# Patient Record
Sex: FEMALE | Race: WHITE | HISPANIC OR LATINO | Employment: UNEMPLOYED | URBAN - METROPOLITAN AREA
[De-identification: names, ages, dates, MRNs, and addresses within clinical notes are randomized per-mention and may not be internally consistent; named-entity substitution may affect disease eponyms.]

---

## 2017-01-12 ENCOUNTER — ROUTINE PRENATAL (OUTPATIENT)
Dept: OBSTETRICS AND GYNECOLOGY | Facility: CLINIC | Age: 42
End: 2017-01-12
Payer: MEDICAID

## 2017-01-12 VITALS — DIASTOLIC BLOOD PRESSURE: 82 MMHG | WEIGHT: 192 LBS | BODY MASS INDEX: 36.28 KG/M2 | SYSTOLIC BLOOD PRESSURE: 115 MMHG

## 2017-01-12 DIAGNOSIS — Z3A.16 16 WEEKS GESTATION OF PREGNANCY: Primary | ICD-10-CM

## 2017-01-12 DIAGNOSIS — O09.521 AMA (ADVANCED MATERNAL AGE) MULTIGRAVIDA 35+, FIRST TRIMESTER: ICD-10-CM

## 2017-01-12 DIAGNOSIS — O24.410 DIET CONTROLLED GESTATIONAL DIABETES MELLITUS (GDM) IN SECOND TRIMESTER: ICD-10-CM

## 2017-01-12 PROCEDURE — 99999 PR PBB SHADOW E&M-EST. PATIENT-LVL II: CPT | Mod: PBBFAC,,, | Performed by: OBSTETRICS & GYNECOLOGY

## 2017-01-12 PROCEDURE — 99212 OFFICE O/P EST SF 10 MIN: CPT | Mod: PBBFAC,PO | Performed by: OBSTETRICS & GYNECOLOGY

## 2017-01-12 PROCEDURE — 99213 OFFICE O/P EST LOW 20 MIN: CPT | Mod: TH,S$PBB,, | Performed by: OBSTETRICS & GYNECOLOGY

## 2017-01-12 NOTE — PROGRESS NOTES
Jill Kimbrough 41 y.o. yo   yo    IUP @ 16w2d by LMP/ US  C/o:  Fetal movements: No  Abdominal cramping:  No  Vaginal Bleeding:  No  Leakage of Fluid:  No  Passage of tissue:  No  Breast Tenderness:  No  Nausea/Vomiting:  No    Risks/History is pertinent for Gestational Diabetes     PE  Vitals noted    A/P  1- 41 y.o.yo   IUP @ 16w2d   by  LMP/US    Went to Diabetic teaching but DiD not follow with  BS monitoring recommendations ( Will go today to see if can get a new meter) .Following the diet   M21 Ordered today x AMA      FU in= 4weeks    Counseling:   Patient was counseled today on proper weight gain based on the [place] of [facility]'s recommendations based on her pre-pregnancy weight. Discussed foods to avoid in pregnancy (i.e. sushi, fish that are high in mercury, deli meat, and unpasteurized cheeses). She is advised to avoid all raw meat and fish, raw eggs. One can tuna max per week. Discussed prenatal vitamin options (i.e. stool softener, DHA). Prenatal screening options discussed with the patient, including quad screen and amniocentesis. Exercise in pregnancy reviewed with patient as well. Info on medications that can be used in pregnancy were given to the patient. Use Miralax daily for constipation correction.   Discussed: Common complaints of pregnancy, HIV and other routine prenatal tests, Tobacco abuse, risk factors identified by prenatal history, Anticipated course of prenatal careNutrition and weight gain counseling,Toxoplasmosis precautions (Cats/Raw Meat) Exercise, Alcohol, Illicit/Recreational Drugs, Seat belt use, Childbirth classes/Hospital facilities.The counseling session lasted approximately 25 minutes, and all her questions were answered.     Floyd Jacome M.D.   OB/GYN    2017

## 2017-01-30 ENCOUNTER — TELEPHONE (OUTPATIENT)
Dept: OBSTETRICS AND GYNECOLOGY | Facility: CLINIC | Age: 42
End: 2017-01-30

## 2017-01-30 NOTE — TELEPHONE ENCOUNTER
M21 resulted negative    XX baby if patient wants to know     Floyd Jacome M.D.   OB/GYN    1/30/2017

## 2017-02-02 ENCOUNTER — TELEPHONE (OUTPATIENT)
Dept: OBSTETRICS AND GYNECOLOGY | Facility: CLINIC | Age: 42
End: 2017-02-02

## 2017-02-02 NOTE — TELEPHONE ENCOUNTER
Called [facility] and informed them pt has a True Metrix Air meter and instructed to test 4 times a day called in lancets and testing strips with 6 refills to last her the pregnancy pharmacy understood and verbalized understanding.

## 2017-02-02 NOTE — TELEPHONE ENCOUNTER
----- Message from Elvira Hagan sent at 2/2/2017  7:47 AM CST -----  Contact: [phone]/ self   Pt its requesting a refill on her needle pens and test strips sent to [facility] [phone]. Please advise

## 2017-02-09 ENCOUNTER — OFFICE VISIT (OUTPATIENT)
Dept: OBSTETRICS AND GYNECOLOGY | Facility: CLINIC | Age: 42
End: 2017-02-09
Payer: MEDICAID

## 2017-02-09 ENCOUNTER — ROUTINE PRENATAL (OUTPATIENT)
Dept: OBSTETRICS AND GYNECOLOGY | Facility: CLINIC | Age: 42
End: 2017-02-09
Payer: MEDICAID

## 2017-02-09 VITALS
BODY MASS INDEX: 37.12 KG/M2 | DIASTOLIC BLOOD PRESSURE: 78 MMHG | SYSTOLIC BLOOD PRESSURE: 118 MMHG | WEIGHT: 196.44 LBS

## 2017-02-09 DIAGNOSIS — Z34.90 PREGNANCY, UNSPECIFIED GESTATIONAL AGE: Primary | ICD-10-CM

## 2017-02-09 DIAGNOSIS — O09.522 AMA (ADVANCED MATERNAL AGE) MULTIGRAVIDA 35+, SECOND TRIMESTER: ICD-10-CM

## 2017-02-09 DIAGNOSIS — O24.410 DIET CONTROLLED GESTATIONAL DIABETES MELLITUS (GDM) IN SECOND TRIMESTER: ICD-10-CM

## 2017-02-09 DIAGNOSIS — Z3A.16 16 WEEKS GESTATION OF PREGNANCY: ICD-10-CM

## 2017-02-09 DIAGNOSIS — Z36.3 ANTENATAL SCREENING FOR MALFORMATION USING ULTRASONICS: Primary | ICD-10-CM

## 2017-02-09 PROCEDURE — 99999 PR PBB SHADOW E&M-EST. PATIENT-LVL II: CPT | Mod: PBBFAC,,, | Performed by: OBSTETRICS & GYNECOLOGY

## 2017-02-09 PROCEDURE — 76805 OB US >/= 14 WKS SNGL FETUS: CPT | Mod: 26,S$PBB,, | Performed by: OBSTETRICS & GYNECOLOGY

## 2017-02-09 PROCEDURE — 99213 OFFICE O/P EST LOW 20 MIN: CPT | Mod: TH,S$PBB,, | Performed by: OBSTETRICS & GYNECOLOGY

## 2017-02-09 PROCEDURE — 76805 OB US >/= 14 WKS SNGL FETUS: CPT | Mod: PBBFAC,PO

## 2017-02-09 NOTE — PROGRESS NOTES
Jill Kimbrough 41 y.o. yo   yo    IUP @ 20 w2d by LMP/ US  C/o:  Fetal movements: No  Abdominal cramping:  No  Vaginal Bleeding:  No  Leakage of Fluid:  No  Passage of tissue:  No  Breast Tenderness:  No  Nausea/Vomiting:  No    Risks/History is pertinent for Gestational Diabetes and AMA      PE  Vitals noted    A/P  1- 41 y.o.yo   IUP @ 20 w2d   by  LMP/US    Went to diabetic teaching. Brings BS log and most  values are within range ( yesterday had hamburger/fries , so were quite  high)    M21 =Negative   Anatomy US today = normal anatomy     FU in= 4 weeks wit BS log, recommended to be on top of the diet,     Counseling:   Patient was counseled today on proper weight gain based on the [place] of [facility]'s recommendations based on her pre-pregnancy weight. Discussed foods to avoid in pregnancy (i.e. sushi, fish that are high in mercury, deli meat, and unpasteurized cheeses). She is advised to avoid all raw meat and fish, raw eggs. One can tuna max per week. Discussed prenatal vitamin options (i.e. stool softener, DHA). Prenatal screening options discussed with the patient, including quad screen and amniocentesis. Exercise in pregnancy reviewed with patient as well. Info on medications that can be used in pregnancy were given to the patient. Use Miralax daily for constipation correction.   Discussed: Common complaints of pregnancy, HIV and other routine prenatal tests, Tobacco abuse, risk factors identified by prenatal history, Anticipated course of prenatal careNutrition and weight gain counseling,Toxoplasmosis precautions (Cats/Raw Meat) Exercise, Alcohol, Illicit/Recreational Drugs, Seat belt use, Childbirth classes/Hospital facilities.The counseling session lasted approximately 25 minutes, and all her questions were answered.     Floyd Jacome M.D.   OB/GYN    2017

## 2017-02-14 ENCOUNTER — INITIAL CONSULT (OUTPATIENT)
Dept: OPTOMETRY | Facility: CLINIC | Age: 42
End: 2017-02-14
Payer: MEDICAID

## 2017-02-14 DIAGNOSIS — O24.419 GESTATIONAL DIABETES MELLITUS (GDM) IN SECOND TRIMESTER, GESTATIONAL DIABETES METHOD OF CONTROL UNSPECIFIED: Primary | ICD-10-CM

## 2017-02-14 PROCEDURE — 92004 COMPRE OPH EXAM NEW PT 1/>: CPT | Mod: S$PBB,,, | Performed by: OPTOMETRIST

## 2017-02-14 PROCEDURE — 92015 DETERMINE REFRACTIVE STATE: CPT | Mod: ,,, | Performed by: OPTOMETRIST

## 2017-02-14 PROCEDURE — 99212 OFFICE O/P EST SF 10 MIN: CPT | Mod: PBBFAC,PO | Performed by: OPTOMETRIST

## 2017-02-14 PROCEDURE — 99999 PR PBB SHADOW E&M-EST. PATIENT-LVL II: CPT | Mod: PBBFAC,,, | Performed by: OPTOMETRIST

## 2017-02-14 NOTE — LETTER
February 14, 2017      Floyd Jacome MD  [address] [facility] [address]           [place] [facility]  [address] [facility]  [place] [address]  Phone: [phone]  Fax: [phone]          Patient: Jill Kimbrough   MR Number: [id number]   YOB: 1975   Date of Visit: 2/14/2017       Dear Dr. Floyd Jacome:    Thank you for referring Jill Kimbrough to me for evaluation. Attached you will find relevant portions of my assessment and plan of care.    If you have questions, please do not hesitate to call me. I look forward to following Jill Kimbrough along with you.    Sincerely,    Renzo Lin, OD    Enclosure  CC:  No Recipients    If you would like to receive this communication electronically, please contact [email or web address] or [phone] to request more information on [employer] Link access.    For providers and/or their staff who would like to refer a patient to [facility], please contact us through our one-stop-shop provider referral line, [facility] , at [phone].    If you feel you have received this communication in error or would no longer like to receive these types of communications, please e-mail [email or web address]

## 2017-02-14 NOTE — PROGRESS NOTES
HPI     TEX: 6+ years ago   Pt states harder to read. States when driving eyes get red and itch   Denies f/f    No gtts     Did not check BS this morning   Hemoglobin A1C       Date                     Value               Ref Range             Status                12/13/2016               5.5                 4.5 - 6.2 %           Final              ----------    Gestational DM (5 months preg)  Hx Horse Shoe Palsy right side 5 years ago         Last edited by Renzo Lin, OD on 2/14/2017  9:24 AM.     ROS     Positive for: Endocrine    Negative for: Constitutional, Gastrointestinal, Neurological, Skin,   Genitourinary, Musculoskeletal, HENT, Cardiovascular, Eyes, Respiratory,   Psychiatric, Allergic/Imm, Heme/Lymph    Last edited by Renzo Lin, OD on 2/14/2017  9:17 AM. (History)        Assessment /Plan     For exam results, see Encounter Report.    Gestational diabetes mellitus (GDM) in second trimester, gestational diabetes method of control unspecified      1. Gestational DM- WITHOUT RETINOPATHY. Discussed visual flux assoc w sugar flux.  Pt may be having early presby sx--advised otc readers for now, and may come back one month after baby born for refraction for spex  2. MERT--advised SYSTANE ATs prn    PLAN:    rtc 1 yr, sooner for spex as above    (ESL pt--had )

## 2017-03-09 ENCOUNTER — ROUTINE PRENATAL (OUTPATIENT)
Dept: OBSTETRICS AND GYNECOLOGY | Facility: CLINIC | Age: 42
End: 2017-03-09
Payer: MEDICAID

## 2017-03-09 VITALS
WEIGHT: 198.19 LBS | DIASTOLIC BLOOD PRESSURE: 70 MMHG | BODY MASS INDEX: 37.45 KG/M2 | SYSTOLIC BLOOD PRESSURE: 108 MMHG

## 2017-03-09 DIAGNOSIS — O09.522 AMA (ADVANCED MATERNAL AGE) MULTIGRAVIDA 35+, SECOND TRIMESTER: ICD-10-CM

## 2017-03-09 DIAGNOSIS — O24.410 DIET CONTROLLED GESTATIONAL DIABETES MELLITUS (GDM) IN SECOND TRIMESTER: ICD-10-CM

## 2017-03-09 DIAGNOSIS — Z3A.24 24 WEEKS GESTATION OF PREGNANCY: Primary | ICD-10-CM

## 2017-03-09 PROCEDURE — 99999 PR PBB SHADOW E&M-EST. PATIENT-LVL II: CPT | Mod: PBBFAC,,, | Performed by: OBSTETRICS & GYNECOLOGY

## 2017-03-09 PROCEDURE — 99213 OFFICE O/P EST LOW 20 MIN: CPT | Mod: TH,S$PBB,, | Performed by: OBSTETRICS & GYNECOLOGY

## 2017-03-09 PROCEDURE — 99212 OFFICE O/P EST SF 10 MIN: CPT | Mod: PBBFAC,PO | Performed by: OBSTETRICS & GYNECOLOGY

## 2017-03-09 NOTE — PROGRESS NOTES
Jill Kimbrough 41 y.o. yo   yo    IUP @ 24w2d by LMP/ US  C/o:  Fetal movements: yes  Abdominal cramping:  No  Vaginal Bleeding:  No  Leakage of Fluid:  No  Passage of tissue:  No  Breast Tenderness:  No  Nausea/Vomiting:  No    Risks/History is pertinent for Gestational Diabetes and AMA    Non complaint. Forgets to check the blood sugar as recommended  Does not bring the BS log . It seems that is not following the Diet       PE  Vitals noted    A/P  1- 41 y.o.yo   IUP @ 24 w2d   by  LMP/US      M21 =Negative   Anatomy US today = normal anatomy     FU in= 4 weeks with BS log, recommended to follow with the recommendations               To check the glucose levels, and to bring the log so I can see them     Counseling:   Patient was counseled today on proper weight gain based on the [place] of [facility]'s recommendations based on her pre-pregnancy weight. Discussed foods to avoid in pregnancy (i.e. sushi, fish that are high in mercury, deli meat, and unpasteurized cheeses). She is advised to avoid all raw meat and fish, raw eggs. One can tuna max per week. Discussed prenatal vitamin options (i.e. stool softener, DHA). Prenatal screening options discussed with the patient, including quad screen and amniocentesis. Exercise in pregnancy reviewed with patient as well. Info on medications that can be used in pregnancy were given to the patient. Use Miralax daily for constipation correction.   Discussed: Common complaints of pregnancy, HIV and other routine prenatal tests, Tobacco abuse, risk factors identified by prenatal history, Anticipated course of prenatal careNutrition and weight gain counseling,Toxoplasmosis precautions (Cats/Raw Meat) Exercise, Alcohol, Illicit/Recreational Drugs, Seat belt use, Childbirth classes/Hospital facilities.The counseling session lasted approximately 25 minutes, and all her questions were answered.     Floyd Jacome M.D.   OB/GYN    3/9/2017

## 2017-03-13 ENCOUNTER — TELEPHONE (OUTPATIENT)
Dept: OBSTETRICS AND GYNECOLOGY | Facility: CLINIC | Age: 42
End: 2017-03-13

## 2017-03-13 NOTE — TELEPHONE ENCOUNTER
----- Message from Eliane Tawny sent at 3/13/2017 12:52 PM CDT -----  Contact: self, [phone]  Patient requests to be seen tomorrow morning if possible, states her hands are getting numb more often every day now. Please advise.

## 2017-03-25 ENCOUNTER — HOSPITAL ENCOUNTER (OUTPATIENT)
Facility: HOSPITAL | Age: 42
Discharge: HOME | End: 2017-03-25
Attending: OBSTETRICS & GYNECOLOGY | Admitting: OBSTETRICS & GYNECOLOGY
Payer: MEDICAID

## 2017-03-25 VITALS — SYSTOLIC BLOOD PRESSURE: 121 MMHG | DIASTOLIC BLOOD PRESSURE: 72 MMHG | HEART RATE: 105 BPM | OXYGEN SATURATION: 97 %

## 2017-03-25 DIAGNOSIS — R10.9 ABDOMINAL PAIN COMPLICATING PREGNANCY: ICD-10-CM

## 2017-03-25 DIAGNOSIS — O26.899 ABDOMINAL PAIN COMPLICATING PREGNANCY: ICD-10-CM

## 2017-03-25 LAB
BILIRUB UR QL STRIP: NEGATIVE
CLARITY UR: ABNORMAL
COLOR UR: YELLOW
GLUCOSE UR QL STRIP: NEGATIVE
HGB UR QL STRIP: ABNORMAL
KETONES UR QL STRIP: ABNORMAL
LEUKOCYTE ESTERASE UR QL STRIP: ABNORMAL
MICROSCOPIC COMMENT: NORMAL
NITRITE UR QL STRIP: NEGATIVE
PH UR STRIP: 6 [PH] (ref 5–8)
PROT UR QL STRIP: NEGATIVE
RBC #/AREA URNS HPF: 0 /HPF (ref 0–4)
SP GR UR STRIP: 1.01 (ref 1–1.03)
URN SPEC COLLECT METH UR: ABNORMAL
UROBILINOGEN UR STRIP-ACNC: 1 EU/DL
WBC #/AREA URNS HPF: 2 /HPF (ref 0–5)

## 2017-03-25 PROCEDURE — G0378 HOSPITAL OBSERVATION PER HR: HCPCS

## 2017-03-25 PROCEDURE — 81000 URINALYSIS NONAUTO W/SCOPE: CPT

## 2017-03-25 PROCEDURE — 99211 OFF/OP EST MAY X REQ PHY/QHP: CPT

## 2017-03-25 PROCEDURE — 87086 URINE CULTURE/COLONY COUNT: CPT

## 2017-03-25 NOTE — PLAN OF CARE
Pt arrived to unit from ED with c/o lower abd pain and pressure that started yesterday, and worsens when she uses the bathroom. Pt also c/o mucous vaginal discharge. Pt denies bleeding or leaking fluid. Pt gowned and oriented to room. efms applied. FFN collected and sve done. Full assessment completed. Hx reviewed. poc explained to pt and her daughter who is at the bedside with her. Will continue to monitor.    Dr. feng on unit. md aware of pts arrival and complaint, as well as present sve. md states to collect a urine for ua, may dispose of FFN. Pt may be discharged once she is able to give urine specimen. md will call her with results of culture.    1415- pt up to restroom to give urine sample. Explained to pt that she will discharged home once she urinates as she is not in labor and her baby looks good. Pt verbalizes understanding.

## 2017-03-25 NOTE — IP AVS SNAPSHOT
[facility]  [address]  [place] [address]  Phone: [phone]           Instrucciones de Marina de Pacientes    Nuestro objetivo es que te prepara para el éxito. Gi paquete incluye información sobre ybarra enfermedad, medicamentos y atención médica a domicilio. [place] le ayudará a cuidar y que no se enferman más y necesita volver al hospital.     Por favor, pregunte a ybarra enfermera si tiene alguna pregunta.       Hay muchos detalles a recordar cuando se prepara para salir del hospital. [place] es lo que necesita hacer:    1. [place] ybarra medicina. Si usted tiene celi receta, revise la lista de medicamentos en las siguientes páginas. Es posible que tenga nuevos medicamentos para recoger en la farmacia y otros que tendrá que dejar de danielle. Revise las instrucciones sobre cómo y cuándo danielle lamont medicamentos. Consulte con el médico o el enfermeras si no está seguro de qué hacer.    2. Ir a lamont citas de seguimiento. La información específica de seguimiento aparece en las siguientes páginas. Usted puede ser contactado por celi enfermera o proveedor clínico sobre las próximas citas. Estar seguro de que tiene todos los números de teléfono para comunicarse si es necesario. Por favor, póngase en contacto con la oficina de ybarra profesional médico si no puede hacer celi eva.     3. Esté atento a señales de advertencia. El médico o la enfermera le dará señales de alarma detallados que debe observar y cuándo llamar para la ayuda. Estas instrucciones también pueden incluir información educativa acerca de ybarra condición. Si usted experimenta cualquiera de las señales de advertencia para ybarra catrachito, llame a ybarra médico.             [facility]lamada    Si ybarra médico no le ha indicado a lo contrário, por favor   contactar a [facility] [place], nuestra línea de cuidado de enfermeras está disponible para asistirle 24/7.    [phone] (servicio gratuito)    Enfermeras registradas de [facility] pueden ayudarle a reservar celi eva,  proveer educación para la catrachito, asesoría clínica, y otros servicios de asesoramiento.                  ** Verificar la lista de medicamentos es correcta y está actualizada. Llevar esto con usted en tyler de emergencia. Si regla medicamentos butterfield cambiado, por favor notifique a ybarra proveedor de atención médica.             Lista de medicamentos      CONSULTE con ybarra médico sobre estos medicamentos        Additional Info                      PRENATAL #115-IRON-FOLIC ACID ORAL   Recargas:  0    Instrucciones:  Take by mouth.     Begin Date    AM    Noon    PM    Bedtime                  Por favor traiga a todos las citas de seguimiento:    1. Adina copia de las instrucciones de derick.  2. Todos los medicamentos que está tomando malgorzata momento, en regla envases originales.  3. Identificación y tarjeta de seguro.    Por favor llegue 15 minutos antes de la hora de la eva.    Por favor llame con 24 horas de antelación si tiene que cambiar ybarra eva y / o tiempo.        Regla Citas Programadas     Apr 06, 2017 10:15 AM CDT   Routine Prenatal Visit with MD [place] [facility] ([place])    [address] [place] [address], [address]  [address] [place] [address]  [place] [id number] [phone]                  Instrucciones a johnathan de derick       Discharge home in stable condition. Dr. Jacome will call on Monday with results of urinalysis.      Información de Admisión     Fecha y hora Proveedor Departamento [facility]    3/25/2017  1:40 PM Floyd Jacome MD [facility] [phone]      Los proveedores de cuidado     Personal Médico Rol [facility] Teléfono principal de la oficina    Floyd Jacome MD Attending Provider Obstetrics and Gynecology [phone]      Regla signos vitales isabel     PS Pulso Ultima menstruación SpO2          121/72 105 09/20/2016 97%        Recent Lab Values        12/13/2016                           9:11 AM           A1C 5.5           Comment for A1C at  9:11 AM on 12/13/2016:  According to ADA  guidelines, hemoglobin A1C <7.0% represents  optimal control in non-pregnant diabetic patients.  Different  metrics may apply to specific populations.   Standards of Medical Care in Diabetes - 2016.  For the purpose of screening for the presence of diabetes:  <5.7%     Consistent with the absence of diabetes  5.7-6.4%  Consistent with increasing risk for diabetes   (prediabetes)  >or=6.5%  Consistent with diabetes  Currently no consensus exists for use of hemoglobin A1C  for diagnosis of diabetes for children.        Pending Labs     Order Current Status    Urinalysis In process    Urine culture In process      Alergias     A partir del:  3/25/2017        No Known Allergies      Directiva Anticipada     Adina directiva anticipada es un documento que, en tyler de que ya no capaz de hacer decisiones por sí mismo, le dice a ybarra equipo médico qué tipo de tratamiento quiere o no quiere recibir, o que le gustaría danielle esas decisiones para usted . Si actualmente no tiene adina directiva anticipada, [facility] le anima a crear abby. Para más información llame al: [phone]), o entrando en [email or web address].        Language Assistance Services     ATTENTION: Language assistance services are available, free of charge. Please call [phone].      ATENCIÓN: Si habla español, tiene a ybarra disposición servicios gratuitos de asistencia lingüística. Llame al [phone].     CHÚ Ý: N?u b?n nói Ti?ng Vi?t, có các d?ch v? h? tr? ngôn ng? mi?n phí dành cho b?n. G?i s? [phone].        Registrarse para [facility]     La activación de ybarra cuenta [facility] es tan fácil chuck 1-2-3!    1) Ir a [email or web address], seleccione Registrarse Ahora, meter el código de activación y ybarra fecha de nacimiento, y seleccione Próximo.    [id number]  Expires: 5/9/2017  2:22 PM      2) Crear un nombre de usuario y contraseña para usar cuando se visita [facility] en el futuro y selecciona adina pregunta  de seguridad en tyler de que pierda ybarra contraseña y seleccione Próximo.    3) Introduzca ybarra dirección de correo electrónico y mary abelardo en Registrarse!    Información Adicional  Si tiene alguna pregunta, por favor, e-mail [email or web address] o llame al [phone] para hablar con nuestro personal. Recuerde, [facility] no debe ser usada para necesidades urgentes. En tyler de emergencia médica, llame al [phone].         [facility] cumple con las leyes federales aplicables de derechos civiles y no discrimina por motivos de quinn, color, origen nacional, edad, discapacidad, o sexo.                        [facility]  [address]  [place] [address]  Phone: [phone]           Patient Discharge Instructions     Our goal is to set you up for success. This packet includes information on your condition, medications, and your home care. It will help you to care for yourself so you don't get sicker and need to go back to the hospital.     Please ask your nurse if you have any questions.        There are many details to remember when preparing to leave the hospital. Here is what you will need to do:    1. Take your medicine. If you are prescribed medications, review your Medication List in the following pages. You may have new medications to  at the pharmacy and others that you'll need to stop taking. Review the instructions for how and when to take your medications. Talk with your doctor or nurses if you are unsure of what to do.     2. Go to your follow-up appointments. Specific follow-up information is listed in the following pages. Your may be contacted by a transition nurse or clinical provider about future appointments. Be sure we have all of the phone numbers to reach you, if needed. Please contact your provider's office if you are unable to make an appointment.     3. Watch for warning signs. Your doctor or nurse will give you detailed warning signs to watch for and when to call for  assistance. These instructions may also include educational information about your condition. If you experience any of warning signs to your health, call your doctor.               [facility]  Unless otherwise directed by your provider, please contact [facility], our nurse care line that is available for 24/7 assistance.     [phone] (toll-free)    Registered nurses in the [facility] provide clinical advisement, health education, appointment booking, and other advisory services.                ** Verificar la lista de medicamentos es correcta y está actualizada. Llevar esto con usted en tyler de emergencia. Si lamont medicamentos butterfield cambiado, por favor notifique a ybarra proveedor de atención médica.             Medication List      ASK your doctor about these medications        Additional Info                      PRENATAL #115-IRON-FOLIC ACID ORAL   Refills:  0    Instructions:  Take by mouth.     Begin Date    AM    Noon    PM    Bedtime                  Por favor traiga a todos las citas de seguimiento:    1. Adina copia de las instrucciones de derick.  2. Todos los medicamentos que está tomando malgorzata momento, en lamont envases originales.  3. Identificación y tarjeta de seguro.    Por favor llegue 15 minutos antes de la hora de la eva.    Por favor llame con 24 horas de antelación si tiene que cambiar ybarra eva y / o tiempo.        Your Scheduled Appointments     Apr 06, 2017 10:15 AM CDT   Routine Prenatal Visit with MD [place] [facility] ([place])    [address] [place] [address], [address]  [address]th [address] [facility]  [place] [address] [phone]                  Discharge Instructions       Discharge home in stable condition. Dr. Jacome will call on Monday with results of urinalysis.      Admission Information     Date & Time Provider Department [facility]    3/25/2017  1:40 PM Floyd Jacome MD [facility] [phone]      Care Providers     Provider Role Specialty Primary  office phone    Floyd Jacome MD Attending Provider Obstetrics and Gynecology [phone]      Your Vitals Were     BP Pulse Last Period SpO2          121/72 105 09/20/2016 97%        Recent Lab Values        12/13/2016                           9:11 AM           A1C 5.5           Comment for A1C at  9:11 AM on 12/13/2016:  According to ADA guidelines, hemoglobin A1C <7.0% represents  optimal control in non-pregnant diabetic patients.  Different  metrics may apply to specific populations.   Standards of Medical Care in Diabetes - 2016.  For the purpose of screening for the presence of diabetes:  <5.7%     Consistent with the absence of diabetes  5.7-6.4%  Consistent with increasing risk for diabetes   (prediabetes)  >or=6.5%  Consistent with diabetes  Currently no consensus exists for use of hemoglobin A1C  for diagnosis of diabetes for children.        Pending Labs     Order Current Status    Urinalysis In process    Urine culture In process      Allergies as of 3/25/2017     No Known Allergies      Advance Directives     An advance directive is a document which, in the event you are no longer able to make decisions for yourself, tells your healthcare team what kind of treatment you do or do not want to receive, or who you would like to make those decisions for you.  If you do not currently have an advance directive, [facility] encourages you to create one.  For more information call:  [phone]), [phone] ([phone]),  or log on to [email or web address].        Language Assistance Services     ATTENTION: Language assistance services are available, free of charge. Please call [phone].      ATENCIÓN: Si habla español, tiene a ybarra disposición servicios gratuitos de asistencia lingüística. Llame al [phone].     CHÚ Ý: N?u b?n nói Ti?ng Vi?t, có các d?ch v? h? tr? ngôn ng? mi?n phí dành cho b?n. G?i s? [phone].        [facility] Sign-Up     Activating your [facility]  account is as easy as 1-2-3!     1) Visit [email or web address], select Sign Up Now, enter this activation code and your date of birth, then select Next.  [email or web address]  Expires: 5/9/2017  2:22 PM      2) Create a username and password to use when you visit [facility] in the future and select a security question in case you lose your password and select Next.    3) Enter your e-mail address and click Sign Up!    Additional Information  If you have questions, please e-mail [email or web address] or call [phone] to talk to our [facility] [facility] staff. Remember, [facility] is NOT to be used for urgent needs. For medical emergencies, dial 911.          [facility] complies with applicable Federal civil rights laws and does not discriminate on the basis of race, color, national origin, age, disability, or sex.

## 2017-03-26 ENCOUNTER — ANESTHESIA EVENT (OUTPATIENT)
Dept: OBSTETRICS AND GYNECOLOGY | Facility: OTHER | Age: 42
End: 2017-03-26
Payer: MEDICAID

## 2017-03-26 ENCOUNTER — ANESTHESIA (OUTPATIENT)
Dept: OBSTETRICS AND GYNECOLOGY | Facility: OTHER | Age: 42
End: 2017-03-26
Payer: MEDICAID

## 2017-03-26 ENCOUNTER — HOSPITAL ENCOUNTER (INPATIENT)
Facility: OTHER | Age: 42
LOS: 4 days | Discharge: HOME | End: 2017-03-30
Attending: OBSTETRICS & GYNECOLOGY | Admitting: OBSTETRICS & GYNECOLOGY
Payer: MEDICAID

## 2017-03-26 ENCOUNTER — HOSPITAL ENCOUNTER (OUTPATIENT)
Facility: HOSPITAL | Age: 42
End: 2017-03-26
Attending: OBSTETRICS & GYNECOLOGY | Admitting: OBSTETRICS & GYNECOLOGY
Payer: MEDICAID

## 2017-03-26 VITALS
BODY MASS INDEX: 37.11 KG/M2 | HEART RATE: 111 BPM | SYSTOLIC BLOOD PRESSURE: 128 MMHG | WEIGHT: 189 LBS | HEIGHT: 60 IN | DIASTOLIC BLOOD PRESSURE: 60 MMHG | OXYGEN SATURATION: 97 % | TEMPERATURE: 99 F

## 2017-03-26 DIAGNOSIS — O09.522 AMA (ADVANCED MATERNAL AGE) MULTIGRAVIDA 35+, SECOND TRIMESTER: ICD-10-CM

## 2017-03-26 DIAGNOSIS — O24.410 DIET CONTROLLED GESTATIONAL DIABETES MELLITUS (GDM) IN SECOND TRIMESTER: ICD-10-CM

## 2017-03-26 DIAGNOSIS — Z30.09 UNWANTED FERTILITY: ICD-10-CM

## 2017-03-26 DIAGNOSIS — O47.00 THREATENED PRETERM LABOR: ICD-10-CM

## 2017-03-26 DIAGNOSIS — R10.9 ABDOMINAL PAIN AFFECTING PREGNANCY: ICD-10-CM

## 2017-03-26 DIAGNOSIS — O26.899 ABDOMINAL PAIN AFFECTING PREGNANCY: ICD-10-CM

## 2017-03-26 DIAGNOSIS — O42.919 PRETERM PREMATURE RUPTURE OF MEMBRANES, ANTEPARTUM: Primary | ICD-10-CM

## 2017-03-26 DIAGNOSIS — O09.42 GRAND MULTIPARITY WITH ANTENATAL PROBLEM, SECOND TRIMESTER: ICD-10-CM

## 2017-03-26 DIAGNOSIS — O47.02 THREATENED PRETERM LABOR, SECOND TRIMESTER: ICD-10-CM

## 2017-03-26 DIAGNOSIS — Z36.3 ENCOUNTER FOR ROUTINE SCREENING FOR MALFORMATION USING ULTRASONICS: ICD-10-CM

## 2017-03-26 LAB
ABO + RH BLD: NORMAL
ALBUMIN SERPL BCP-MCNC: 2.6 G/DL
ALP SERPL-CCNC: 79 U/L
ALT SERPL W/O P-5'-P-CCNC: 12 U/L
ANION GAP SERPL CALC-SCNC: 10 MMOL/L
AST SERPL-CCNC: 12 U/L
BACTERIA UR CULT: NORMAL
BASOPHILS # BLD AUTO: 0.02 K/UL
BASOPHILS NFR BLD: 0.1 %
BILIRUB SERPL-MCNC: 0.4 MG/DL
BLD GP AB SCN CELLS X3 SERPL QL: NORMAL
BUN SERPL-MCNC: 4 MG/DL
CALCIUM SERPL-MCNC: 9.1 MG/DL
CHLORIDE SERPL-SCNC: 105 MMOL/L
CO2 SERPL-SCNC: 19 MMOL/L
CREAT SERPL-MCNC: 0.6 MG/DL
DIFFERENTIAL METHOD: ABNORMAL
EOSINOPHIL # BLD AUTO: 0.1 K/UL
EOSINOPHIL NFR BLD: 0.7 %
ERYTHROCYTE [DISTWIDTH] IN BLOOD BY AUTOMATED COUNT: 15 %
EST. GFR  (AFRICAN AMERICAN): >60 ML/MIN/1.73 M^2
EST. GFR  (NON AFRICAN AMERICAN): >60 ML/MIN/1.73 M^2
FIBRONECTIN FETAL SPEC QL: POSITIVE
GLUCOSE SERPL-MCNC: 118 MG/DL
HCT VFR BLD AUTO: 34.2 %
HGB BLD-MCNC: 11.5 G/DL
LYMPHOCYTES # BLD AUTO: 1.8 K/UL
LYMPHOCYTES NFR BLD: 12.8 %
MCH RBC QN AUTO: 27.1 PG
MCHC RBC AUTO-ENTMCNC: 33.6 %
MCV RBC AUTO: 81 FL
MONOCYTES # BLD AUTO: 1.2 K/UL
MONOCYTES NFR BLD: 8.3 %
NEUTROPHILS # BLD AUTO: 10.8 K/UL
NEUTROPHILS NFR BLD: 77 %
PLATELET # BLD AUTO: 212 K/UL
PMV BLD AUTO: 10.7 FL
POCT GLUCOSE: 134 MG/DL (ref 70–110)
POCT GLUCOSE: 151 MG/DL (ref 70–110)
POTASSIUM SERPL-SCNC: 3.3 MMOL/L
PROT SERPL-MCNC: 6.4 G/DL
RBC # BLD AUTO: 4.25 M/UL
SODIUM SERPL-SCNC: 134 MMOL/L
WBC # BLD AUTO: 14.05 K/UL

## 2017-03-26 PROCEDURE — 86850 RBC ANTIBODY SCREEN: CPT

## 2017-03-26 PROCEDURE — 25000003 PHARM REV CODE 250: Performed by: OBSTETRICS & GYNECOLOGY

## 2017-03-26 PROCEDURE — 96367 TX/PROPH/DG ADDL SEQ IV INF: CPT

## 2017-03-26 PROCEDURE — 36415 COLL VENOUS BLD VENIPUNCTURE: CPT

## 2017-03-26 PROCEDURE — 63600175 PHARM REV CODE 636 W HCPCS: Performed by: OBSTETRICS & GYNECOLOGY

## 2017-03-26 PROCEDURE — 86900 BLOOD TYPING SEROLOGIC ABO: CPT

## 2017-03-26 PROCEDURE — 25000003 PHARM REV CODE 250

## 2017-03-26 PROCEDURE — 80053 COMPREHEN METABOLIC PANEL: CPT

## 2017-03-26 PROCEDURE — 25000003 PHARM REV CODE 250: Performed by: STUDENT IN AN ORGANIZED HEALTH CARE EDUCATION/TRAINING PROGRAM

## 2017-03-26 PROCEDURE — 11000001 HC ACUTE MED/SURG PRIVATE ROOM

## 2017-03-26 PROCEDURE — 96361 HYDRATE IV INFUSION ADD-ON: CPT

## 2017-03-26 PROCEDURE — 63600175 PHARM REV CODE 636 W HCPCS

## 2017-03-26 PROCEDURE — 85025 COMPLETE CBC W/AUTO DIFF WBC: CPT

## 2017-03-26 PROCEDURE — 96374 THER/PROPH/DIAG INJ IV PUSH: CPT

## 2017-03-26 PROCEDURE — 96372 THER/PROPH/DIAG INJ SC/IM: CPT

## 2017-03-26 PROCEDURE — 51702 INSERT TEMP BLADDER CATH: CPT

## 2017-03-26 PROCEDURE — 99222 1ST HOSP IP/OBS MODERATE 55: CPT | Mod: ,,, | Performed by: OBSTETRICS & GYNECOLOGY

## 2017-03-26 PROCEDURE — 96368 THER/DIAG CONCURRENT INF: CPT

## 2017-03-26 PROCEDURE — 96360 HYDRATION IV INFUSION INIT: CPT

## 2017-03-26 PROCEDURE — 87086 URINE CULTURE/COLONY COUNT: CPT

## 2017-03-26 PROCEDURE — G0378 HOSPITAL OBSERVATION PER HR: HCPCS

## 2017-03-26 PROCEDURE — 63600175 PHARM REV CODE 636 W HCPCS: Performed by: STUDENT IN AN ORGANIZED HEALTH CARE EDUCATION/TRAINING PROGRAM

## 2017-03-26 PROCEDURE — 82731 ASSAY OF FETAL FIBRONECTIN: CPT

## 2017-03-26 PROCEDURE — G0379 DIRECT REFER HOSPITAL OBSERV: HCPCS

## 2017-03-26 PROCEDURE — 99211 OFF/OP EST MAY X REQ PHY/QHP: CPT | Mod: 25

## 2017-03-26 RX ORDER — ONDANSETRON 8 MG/1
8 TABLET, ORALLY DISINTEGRATING ORAL EVERY 8 HOURS PRN
Status: DISCONTINUED | OUTPATIENT
Start: 2017-03-26 | End: 2017-03-28

## 2017-03-26 RX ORDER — MAGNESIUM SULFATE HEPTAHYDRATE 40 MG/ML
4 INJECTION, SOLUTION INTRAVENOUS ONCE
Status: COMPLETED | OUTPATIENT
Start: 2017-03-26 | End: 2017-03-26

## 2017-03-26 RX ORDER — SODIUM CHLORIDE, SODIUM LACTATE, POTASSIUM CHLORIDE, CALCIUM CHLORIDE 600; 310; 30; 20 MG/100ML; MG/100ML; MG/100ML; MG/100ML
INJECTION, SOLUTION INTRAVENOUS CONTINUOUS
Status: DISCONTINUED | OUTPATIENT
Start: 2017-03-26 | End: 2017-03-26 | Stop reason: HOSPADM

## 2017-03-26 RX ORDER — DIPHENHYDRAMINE HYDROCHLORIDE 50 MG/ML
25 INJECTION INTRAMUSCULAR; INTRAVENOUS EVERY 4 HOURS PRN
Status: DISCONTINUED | OUTPATIENT
Start: 2017-03-26 | End: 2017-03-28

## 2017-03-26 RX ORDER — MAGNESIUM SULFATE HEPTAHYDRATE 40 MG/ML
2 INJECTION, SOLUTION INTRAVENOUS CONTINUOUS
Status: DISCONTINUED | OUTPATIENT
Start: 2017-03-26 | End: 2017-03-27

## 2017-03-26 RX ORDER — INDOMETHACIN 25 MG/1
25 CAPSULE ORAL 4 TIMES DAILY
Status: DISCONTINUED | OUTPATIENT
Start: 2017-03-26 | End: 2017-03-26

## 2017-03-26 RX ORDER — BETAMETHASONE SODIUM PHOSPHATE AND BETAMETHASONE ACETATE 3; 3 MG/ML; MG/ML
12 INJECTION, SUSPENSION INTRA-ARTICULAR; INTRALESIONAL; INTRAMUSCULAR; SOFT TISSUE ONCE
Status: DISCONTINUED | OUTPATIENT
Start: 2017-03-27 | End: 2017-03-26

## 2017-03-26 RX ORDER — ACETAMINOPHEN 500 MG
500 TABLET ORAL EVERY 6 HOURS PRN
Status: DISCONTINUED | OUTPATIENT
Start: 2017-03-26 | End: 2017-03-26 | Stop reason: HOSPADM

## 2017-03-26 RX ORDER — SODIUM CHLORIDE, SODIUM LACTATE, POTASSIUM CHLORIDE, CALCIUM CHLORIDE 600; 310; 30; 20 MG/100ML; MG/100ML; MG/100ML; MG/100ML
1000 INJECTION, SOLUTION INTRAVENOUS CONTINUOUS
Status: ACTIVE | OUTPATIENT
Start: 2017-03-26 | End: 2017-03-26

## 2017-03-26 RX ORDER — TERBUTALINE SULFATE 1 MG/ML
0.25 INJECTION SUBCUTANEOUS
Status: DISCONTINUED | OUTPATIENT
Start: 2017-03-26 | End: 2017-03-26 | Stop reason: HOSPADM

## 2017-03-26 RX ORDER — AMOXICILLIN 250 MG
1 CAPSULE ORAL NIGHTLY PRN
Status: DISCONTINUED | OUTPATIENT
Start: 2017-03-26 | End: 2017-03-28

## 2017-03-26 RX ORDER — AMOXICILLIN 250 MG/1
500 CAPSULE ORAL EVERY 8 HOURS
Status: DISCONTINUED | OUTPATIENT
Start: 2017-03-28 | End: 2017-03-28

## 2017-03-26 RX ORDER — PRENATAL WITH FERROUS FUM AND FOLIC ACID 3080; 920; 120; 400; 22; 1.84; 3; 20; 10; 1; 12; 200; 27; 25; 2 [IU]/1; [IU]/1; MG/1; [IU]/1; MG/1; MG/1; MG/1; MG/1; MG/1; MG/1; UG/1; MG/1; MG/1; MG/1; MG/1
1 TABLET ORAL DAILY
Status: DISCONTINUED | OUTPATIENT
Start: 2017-03-26 | End: 2017-03-28

## 2017-03-26 RX ORDER — CALCIUM GLUCONATE 98 MG/ML
1 INJECTION, SOLUTION INTRAVENOUS
Status: DISCONTINUED | OUTPATIENT
Start: 2017-03-26 | End: 2017-03-27

## 2017-03-26 RX ORDER — INDOMETHACIN 25 MG/1
25 CAPSULE ORAL 4 TIMES DAILY
Status: DISCONTINUED | OUTPATIENT
Start: 2017-03-26 | End: 2017-03-27

## 2017-03-26 RX ORDER — SODIUM CHLORIDE, SODIUM LACTATE, POTASSIUM CHLORIDE, CALCIUM CHLORIDE 600; 310; 30; 20 MG/100ML; MG/100ML; MG/100ML; MG/100ML
INJECTION, SOLUTION INTRAVENOUS CONTINUOUS
Status: DISCONTINUED | OUTPATIENT
Start: 2017-03-26 | End: 2017-03-28

## 2017-03-26 RX ORDER — SIMETHICONE 80 MG
1 TABLET,CHEWABLE ORAL EVERY 6 HOURS PRN
Status: DISCONTINUED | OUTPATIENT
Start: 2017-03-26 | End: 2017-03-28

## 2017-03-26 RX ORDER — MAGNESIUM SULFATE HEPTAHYDRATE 40 MG/ML
2 INJECTION, SOLUTION INTRAVENOUS CONTINUOUS
Status: DISCONTINUED | OUTPATIENT
Start: 2017-03-26 | End: 2017-03-26 | Stop reason: HOSPADM

## 2017-03-26 RX ORDER — AZITHROMYCIN 250 MG/1
1000 TABLET, FILM COATED ORAL ONCE
Status: COMPLETED | OUTPATIENT
Start: 2017-03-26 | End: 2017-03-26

## 2017-03-26 RX ORDER — DIPHENHYDRAMINE HCL 25 MG
25 CAPSULE ORAL EVERY 4 HOURS PRN
Status: DISCONTINUED | OUTPATIENT
Start: 2017-03-26 | End: 2017-03-28

## 2017-03-26 RX ORDER — BETAMETHASONE SODIUM PHOSPHATE AND BETAMETHASONE ACETATE 3; 3 MG/ML; MG/ML
12 INJECTION, SUSPENSION INTRA-ARTICULAR; INTRALESIONAL; INTRAMUSCULAR; SOFT TISSUE ONCE
Status: COMPLETED | OUTPATIENT
Start: 2017-03-27 | End: 2017-03-27

## 2017-03-26 RX ORDER — ONDANSETRON 8 MG/1
8 TABLET, ORALLY DISINTEGRATING ORAL EVERY 8 HOURS PRN
Status: DISCONTINUED | OUTPATIENT
Start: 2017-03-26 | End: 2017-03-26 | Stop reason: HOSPADM

## 2017-03-26 RX ORDER — BETAMETHASONE SODIUM PHOSPHATE AND BETAMETHASONE ACETATE 3; 3 MG/ML; MG/ML
12 INJECTION, SUSPENSION INTRA-ARTICULAR; INTRALESIONAL; INTRAMUSCULAR; SOFT TISSUE ONCE
Status: COMPLETED | OUTPATIENT
Start: 2017-03-26 | End: 2017-03-26

## 2017-03-26 RX ADMIN — MAGNESIUM SULFATE IN WATER 2 G/HR: 40 INJECTION, SOLUTION INTRAVENOUS at 04:03

## 2017-03-26 RX ADMIN — Medication 2.5 MILLION UNITS: at 08:03

## 2017-03-26 RX ADMIN — AZITHROMYCIN 1000 MG: 250 TABLET, FILM COATED ORAL at 03:03

## 2017-03-26 RX ADMIN — MAGNESIUM SULFATE IN WATER 4 G: 40 INJECTION, SOLUTION INTRAVENOUS at 04:03

## 2017-03-26 RX ADMIN — MAGNESIUM SULFATE IN WATER 2 G: 40 INJECTION, SOLUTION INTRAVENOUS at 04:03

## 2017-03-26 RX ADMIN — BETAMETHASONE SODIUM PHOSPHATE AND BETAMETHASONE ACETATE 12 MG: 3; 3 INJECTION, SUSPENSION INTRA-ARTICULAR; INTRALESIONAL; INTRAMUSCULAR at 04:03

## 2017-03-26 RX ADMIN — AMPICILLIN SODIUM 2 G: 2 INJECTION, POWDER, FOR SOLUTION INTRAMUSCULAR; INTRAVENOUS at 09:03

## 2017-03-26 RX ADMIN — MAGNESIUM SULFATE IN WATER 2 G/HR: 40 INJECTION, SOLUTION INTRAVENOUS at 06:03

## 2017-03-26 RX ADMIN — SODIUM CHLORIDE, SODIUM LACTATE, POTASSIUM CHLORIDE, AND CALCIUM CHLORIDE 1000 ML: .6; .31; .03; .02 INJECTION, SOLUTION INTRAVENOUS at 06:03

## 2017-03-26 RX ADMIN — AMPICILLIN SODIUM 2 G: 2 INJECTION, POWDER, FOR SOLUTION INTRAMUSCULAR; INTRAVENOUS at 03:03

## 2017-03-26 RX ADMIN — INDOMETHACIN 25 MG: 25 CAPSULE ORAL at 04:03

## 2017-03-26 RX ADMIN — Medication 2.5 MILLION UNITS: at 01:03

## 2017-03-26 RX ADMIN — SODIUM CHLORIDE, SODIUM LACTATE, POTASSIUM CHLORIDE, AND CALCIUM CHLORIDE 1000 ML: .6; .31; .03; .02 INJECTION, SOLUTION INTRAVENOUS at 04:03

## 2017-03-26 RX ADMIN — TERBUTALINE SULFATE 0.25 MG: 1 INJECTION SUBCUTANEOUS at 04:03

## 2017-03-26 RX ADMIN — INDOMETHACIN 25 MG: 25 CAPSULE ORAL at 10:03

## 2017-03-26 RX ADMIN — DEXTROSE 5 MILLION UNITS: 50 INJECTION, SOLUTION INTRAVENOUS at 04:03

## 2017-03-26 RX ADMIN — SODIUM CHLORIDE, SODIUM LACTATE, POTASSIUM CHLORIDE, AND CALCIUM CHLORIDE: .6; .31; .03; .02 INJECTION, SOLUTION INTRAVENOUS at 09:03

## 2017-03-26 NOTE — PROGRESS NOTES
To bedside secondary to complaint of increasing contraction intensity, still q10 mins or so. She reports she is feeling more pressure. Denies bleeding, leakage of fluid and decreased fetal movment.    BP (!) 105/58  Pulse 106  Temp 98 °F (36.7 °C)  Resp 20  Ht 5' (1.524 m)  Wt 85.7 kg (188 lb 15 oz)  LMP 2016  SpO2 98%  Breastfeeding? No  BMI 36.9 kg/m2    NST: Cat 1/Reactive and reassuring/Baseline 135/mod BTBV/pos Accelerations/neg Decelerations  [place]: Every 8-10 minutes    SVE: 3.5/90/-3, unchanged from prior check. Gross pooling noted consistent with  premature rupture of membranes.     1. PPROM. Grossly noted as above.  - Initiate PPROM abx per protocol, nurse notified  - Indomethacin 25mg po through steroid window  - Continuous monitoring/NPO  - Staff notified      MICHAEL Bhatti MD/MPH  Obstetrics & Gynecology, PGY 4  [phone]

## 2017-03-26 NOTE — ANESTHESIA PREPROCEDURE EVALUATION
Jill Kimbrough is a 41 y.o. female  female with IUP at 26w5d weeks gestation who presents as transport from [facility] for higher level of care in setting of threatened PTL. Patient states she started feeling increased pressure and pain yesterday. She initially presented to OSH ER and was ruled out for ROM or labor. She presented again later than day and was found to be mert on the monitor. Patient states she was feeling contractions about every 3-5 minutes. She was found to have made cervical change from 1 cm to 3-3.5 cm. She was then transferred to [facility] in order to have higher level of care for infant. Patient states her contractions have spaced, are now about every 9-10 minutes apart. She otherwise denies vaginal bleeding, denies LOF. She reports good FM.      This IUP is complicated by AMA, grandmultip  x5, and GDM. Patient states her GDM is diet controlled with blood sugars usually between . She reports keeping a blood sugar log and states only a few measurements are in the 140s.     Pt with hx of PCEA x 3 without complications. Previous deliveries were . No hx of anesthesia complications and no hx of previous general anesthesia.     OB History    Para Term  AB SAB TAB Ectopic Multiple Living   7 5 5 0 1 1 0 0 0 5      # Outcome Date GA Lbr Yasmany/2nd Weight Sex Delivery Anes PTL Lv   7 Current            6 Term 01/27/15 39w0d  3.6 kg (7 lb 15 oz) F Vag-Spont EPI N Y   5 Term 09/20/10 40w0d  3.175 kg (7 lb) M Vag-Spont EPI N Y   4 Term 09/15/04 40w0d  3.175 kg (7 lb) F Vag-Spont EPI N Y   3 SAB  12w0d          2 Term 99 40w0d  3.402 kg (7 lb 8 oz) F Vag-Spont None N Y   1 Term 94 40w0d  3.629 kg (8 lb) F Vag-Spont None N Y          Wt Readings from Last 1 Encounters:   17 0315 85.7 kg (189 lb)       BP Readings from Last 3 Encounters:   17 128/60   17 121/72    17 108/70       Patient Active Problem List   Diagnosis    AMA (advanced maternal age) multigravida 35+    BV (bacterial vaginosis)    Abdominal pain complicating pregnancy    Abdominal pain affecting pregnancy    Threatened  labor       No past surgical history on file.    Social History     Social History    Marital status: Single     Spouse name: N/A    Number of children: N/A    Years of education: N/A     Occupational History    Not on file.     Social History Main Topics    Smoking status: Never Smoker    Smokeless tobacco: Not on file    Alcohol use No    Drug use: No    Sexual activity: Yes     Partners: Male     Birth control/ protection: None     Other Topics Concern    Not on file     Social History Narrative         Chemistry        Component Value Date/Time     (L) 2017 0427    K 3.3 (L) 2017 042     2017 042    CO2 19 (L) 2017 042    BUN 4 (L) 2017 042    CREATININE 0.6 2017 042     (H) 2017 042        Component Value Date/Time    CALCIUM 9.1 2017 0427    ALKPHOS 79 2017 0427    AST 12 2017 0427    ALT 12 2017 0427    BILITOT 0.4 2017 0427            Lab Results   Component Value Date    WBC 14.05 (H) 2017    HGB 11.5 (L) 2017    HCT 34.2 (L) 2017    MCV 81 (L) 2017     2017       No results for input(s): INR, PROTIME, APTT in the last 72 hours.    Invalid input(s): PT      [facility] Anesthesia Evaluation    I have reviewed the Patient Summary Reports.     I have reviewed the Medications.     Review of Systems  Anesthesia Hx:  No previous Anesthesia Denies Hx of Anesthetic complications Hx of PCEA without complications however no hx of GETA  Neg history of prior surgery.  Denies Personal Hx of Anesthesia complications.   Hematology/Oncology:     Oncology Normal    -- Anemia:   EENT/Dental:EENT/Dental Normal   Cardiovascular:  Cardiovascular Normal      Pulmonary:  Pulmonary Normal    Renal/:  Renal/ Normal     Hepatic/GI:  Hepatic/GI Normal    Musculoskeletal:  Musculoskeletal Normal    Neurological:  Neurology Normal    Endocrine:   Diabetes    Dermatological:  Skin Normal    Psych:  Psychiatric Normal           Physical Exam  General:  Well nourished    Airway/Jaw/Neck:  Airway Findings: Mouth Opening: Normal Tongue: Normal  General Airway Assessment: Adult  Mallampati: IV  Improves to III with phonation.  TM Distance: 4 - 6 cm  Jaw/Neck Findings:  Neck ROM: Normal ROM     Eyes/Ears/Nose:  EYES/EARS/NOSE FINDINGS: Normal   Dental:  Dental Findings: Upper Dentures    Chest/Lungs:  Chest/Lungs Findings: Clear to auscultation     Heart/Vascular:  Heart Findings: Rate: Normal  Rhythm: Regular Rhythm  Sounds: Normal        Mental Status:  Mental Status Findings:  Cooperative, Alert and Oriented         Anesthesia Plan  Type of Anesthesia, risks & benefits discussed:  Anesthesia Type:  general, epidural, CSE, spinal  Patient's Preference:   Intra-op Monitoring Plan: standard ASA monitors  Intra-op Monitoring Plan Comments:   Post Op Pain Control Plan:   Post Op Pain Control Plan Comments:   Induction:   IV  Beta Blocker:  Patient is not currently on a Beta-Blocker (No further documentation required).       Informed Consent: Patient understands risks and agrees with Anesthesia plan.  Questions answered. Anesthesia consent signed with patient.  ASA Score: 2     Day of Surgery Review of History & Physical:  There are no significant changes.      Anesthesia Plan Notes: Consent obtained via use of [employer] translation services given pt is [place] speaking only. All pt questions answered.         Ready For Surgery From Anesthesia Perspective.

## 2017-03-26 NOTE — PROGRESS NOTES
Magnesium Assessment Note    Subjective:         Jill Kimbrough is a 41 y.o. female at 26w5d on IV MgSO4 for fetal neuroprotection.     Patient reports 2/10 headaches which she attributes to not getting enough sleep/not eating.  Denies CP, denies SOB. Patient reports no nausea/no vomiting. Reports worsening ctx pain, roughly q10min.    Objective:      Temp:  [98 °F (36.7 °C)-99.1 °F (37.3 °C)] 98 °F (36.7 °C)  Pulse:  [] 103  Resp:  [16-20] 20  SpO2:  [92 %-100 %] 98 %  BP: ()/(52-85) 115/61    I/O last 3 completed shifts:  In: 17.5 [I.V.:17.5]  Out: -        General:   alert, appears stated age and cooperative   Lungs:   clear to auscultation bilaterally   Heart::   regular rate and rhythm   Extremities: peripheral pulses normal, no pedal edema, no clubbing or cyanosis   DTRs- 1+  bilaterally     NST: reassuring, Category 1, 140 bpm, moderate BTBV, (+) accelerations, (--) decelerations  TOCO: irritability/not picking up    Lab Review  Recent Results (from the past 24 hour(s))   Urinalysis    Collection Time: 03/25/17  2:16 PM   Result Value Ref Range    Specimen UA Urine, Clean Catch     Color, UA Yellow Yellow, Straw, Liyah    Appearance, UA Hazy (A) Clear    pH, UA 6.0 5.0 - 8.0    Specific Gravity, UA 1.010 1.005 - 1.030    Protein, UA Negative Negative    Glucose, UA Negative Negative    Ketones, UA Trace (A) Negative    Bilirubin (UA) Negative Negative    Occult Blood UA Trace (A) Negative    Nitrite, UA Negative Negative    Urobilinogen, UA 1.0 <2.0 EU/dL    Leukocytes, UA Trace (A) Negative   Urinalysis Microscopic    Collection Time: 03/25/17  2:16 PM   Result Value Ref Range    RBC, UA 0 0 - 4 /hpf    WBC, UA 2 0 - 5 /hpf    Microscopic Comment SEE COMMENT    Fetal fibronectin 26weeks 5days    Collection Time: 03/26/17  3:30 AM   Result Value Ref Range    Fetal Fibronectin Positive (A) Negative   CBC auto differential    Collection Time: 03/26/17  4:27 AM   Result Value Ref Range    WBC 14.05 (H)  3.90 - 12.70 K/uL    RBC 4.25 4.00 - 5.40 M/uL    Hemoglobin 11.5 (L) 12.0 - 16.0 g/dL    Hematocrit 34.2 (L) 37.0 - 48.5 %    MCV 81 (L) 82 - 98 fL    MCH 27.1 27.0 - 31.0 pg    MCHC 33.6 32.0 - 36.0 %    RDW 15.0 (H) 11.5 - 14.5 %    Platelets 212 150 - 350 K/uL    MPV 10.7 9.2 - 12.9 fL    Gran # 10.8 (H) 1.8 - 7.7 K/uL    Lymph # 1.8 1.0 - 4.8 K/uL    Mono # 1.2 (H) 0.3 - 1.0 K/uL    Eos # 0.1 0.0 - 0.5 K/uL    Baso # 0.02 0.00 - 0.20 K/uL    Gran% 77.0 (H) 38.0 - 73.0 %    Lymph% 12.8 (L) 18.0 - 48.0 %    Mono% 8.3 4.0 - 15.0 %    Eosinophil% 0.7 0.0 - 8.0 %    Basophil% 0.1 0.0 - 1.9 %    Differential Method Automated    Comprehensive metabolic panel    Collection Time: 17  4:27 AM   Result Value Ref Range    Sodium 134 (L) 136 - 145 mmol/L    Potassium 3.3 (L) 3.5 - 5.1 mmol/L    Chloride 105 95 - 110 mmol/L    CO2 19 (L) 23 - 29 mmol/L    Glucose 118 (H) 70 - 110 mg/dL    BUN, Bld 4 (L) 6 - 20 mg/dL    Creatinine 0.6 0.5 - 1.4 mg/dL    Calcium 9.1 8.7 - 10.5 mg/dL    Total Protein 6.4 6.0 - 8.4 g/dL    Albumin 2.6 (L) 3.5 - 5.2 g/dL    Total Bilirubin 0.4 0.1 - 1.0 mg/dL    Alkaline Phosphatase 79 55 - 135 U/L    AST 12 10 - 40 U/L    ALT 12 10 - 44 U/L    Anion Gap 10 8 - 16 mmol/L    eGFR if African American >60 >60 mL/min/1.73 m^2    eGFR if non African American >60 >60 mL/min/1.73 m^2   Type & Screen, Labor & Delivery    Collection Time: 17  4:33 AM   Result Value Ref Range    Group & Rh A POS     Indirect Bentley NEG    POCT glucose    Collection Time: 17 12:16 PM   Result Value Ref Range    POCT Glucose 134 (H) 70 - 110 mg/dL        Assessment:     41 y.o.  female at 26w5d, on IV magnesium sulfate.    Active Hospital Problems    Diagnosis  POA    *Threatened  labor [O47.00]  Yes      Resolved Hospital Problems    Diagnosis Date Resolved POA   No resolved problems to display.        Plan:     - Continue magnesium sulfate, no signs of toxicity at this time  - Close  maternal monitoring   - Recheck in 4 hours or PRN    Kamryn Ley MD

## 2017-03-26 NOTE — H&P
UPDATED HISTORY AND PHYSICAL        3/26/2017  Subjective:       Jill Kimbrough is a 41 y.o.  female with IUP at 26w5d weeks gestation who c/o contractions and pelvic pressure.    Contractions have been occuring  q 5 minutes   Was seen on triage yesterday for vaginal pressure  but  Was found not to be  Charanjit without evidence of cervical change  And was sent home with recommendations    This IUP is complicated by AMA and gestational diabetes . Diet controlled. Patient not compliant , following the diet inconsistently .  Patient reports contractions, denies vaginal bleeding, denies LOF.     Previous vaginal deliveries were vaginal at term , the last one c/w gestational diabetes     PMHx: No past medical history on file.    PSHx: No past surgical history on file.    All: Review of patient's allergies indicates:  No Known Allergies    Meds:   Prescriptions Prior to Admission   Medication Sig Dispense Refill Last Dose    PNV NO.115/IRON FUMARATE/FA (PRENATAL #115-IRON-FOLIC ACID ORAL) Take by mouth.   Taking       SH:   Social History     Social History    Marital status: Single     Spouse name: N/A    Number of children: N/A    Years of education: N/A     Occupational History    Not on file.     Social History Main Topics    Smoking status: Never Smoker    Smokeless tobacco: Not on file    Alcohol use No    Drug use: No    Sexual activity: Yes     Partners: Male     Birth control/ protection: None     Other Topics Concern    Not on file     Social History Narrative       FH:   Family History   Problem Relation Age of Onset    Diabetes Maternal Grandmother     Diabetes Mother     Diabetes Brother     Cataracts Brother     Breast cancer Neg Hx     Colon cancer Neg Hx     Ovarian cancer Neg Hx     Glaucoma Neg Hx     Macular degeneration Neg Hx     Retinal detachment Neg Hx     Strabismus Neg Hx     Amblyopia Neg Hx     Blindness Neg Hx        OBHx:   Obstetric History       T5       TAB0   SAB1   E0   M0   L5       # Outcome Date GA Lbr Yasmany/2nd Weight Sex Delivery Anes PTL Lv   7 Current            6 Term 01/27/15 39w0d  3.6 kg (7 lb 15 oz) F Vag-Spont EPI N Y   5 Term 09/20/10 40w0d  3.175 kg (7 lb) M Vag-Spont EPI N Y   4 Term 09/15/04 40w0d  3.175 kg (7 lb) F Vag-Spont EPI N Y   3 2003 12w0d          2 Term 99 40w0d  3.402 kg (7 lb 8 oz) F Vag-Spont None N Y   1 Term 94 40w0d  3.629 kg (8 lb) F Vag-Spont None N Y          Review of Systems: Non contributory      Objective:       LMP 2016    There were no vitals filed for this visit.    General:   alert, appears stated age and cooperative   Lungs:   clear to auscultation bilaterally   Heart:   regular rate and rhythm, S1, S2 normal, no murmur, click, rub or gallop   Abdomen:  soft, non-tender; bowel sounds normal; no masses,  no organomegaly   Extremities negative edema, negative erythema   FHT: 140-150                  TOCO: Q 4-6  minutes   Presentations:    Cervix:     Dilation: 3cm    Effacement: 50%    Station:      Consistency:     Position:          Lab Review  Blood Type A POS       Assessment:       26w5d weeks gestation.  AMA  Gestational diabetes   Pretem labor     Patient Active Problem List   Diagnosis    AMA (advanced maternal age) multigravida 35+    BV (bacterial vaginosis)    Abdominal pain complicating pregnancy          Plan:     Start IV   Labs CBC /CMP Urine culture vaginosis screen  IV fluids   Steroids for FLM  (Bethamethasone  12 mgs  First dose now)  Start Magnesium sulfate for  Neuro protection ( 4grams load then 2 g /hour)    Penicillin x GBS prophylaxis 5 million IU now   Terbutaline SC  First dose now  Given prematurity will transfer to [facility]   I talked to Dr LISBETH Taylor  Who accepted the transfer     Floyd Jacome M.D.   OB/GYN    3/26/2017

## 2017-03-26 NOTE — MEDICAL/APP STUDENT
Magnesium Assessment Note    Subjective:         Jill Kimbrough is a 41 y.o. female at 26w5d on IV MgSO4 for fetal neuroprotection.    Patient reports 2/10 headaches, denies blurry vision and denies right upper quadrant pain. Denies CP, denies SOB. Patient reports no nausea/no vomiting.     Objective:      Temp:  [98 °F (36.7 °C)-99.1 °F (37.3 °C)] 98.5 °F (36.9 °C)  Pulse:  [] 107  Resp:  [16-20] 20  SpO2:  [89 %-100 %] 98 %  BP: ()/(50-85) 104/52    I/O last 3 completed shifts:  In: 17.5 [I.V.:17.5]  Out: -   I/O this shift:  In: 844.2 [I.V.:644.2; IV Piggyback:200]  Out: -     General:   alert, appears stated age and cooperative   Lungs:   clear to auscultation bilaterally   Heart::   regular rate and rhythm   Extremities: peripheral pulses normal, no pedal edema, no clubbing or cyanosis   DTRs- 1+  bilaterally     NST: reassuring, Category 1, 145 bpm, moderate BTBV, (+) accelerations, (--) decelerations  TOCO: irritability      Lab Review  Recent Results (from the past 24 hour(s))   Fetal fibronectin 26weeks 5days    Collection Time: 03/26/17  3:30 AM   Result Value Ref Range    Fetal Fibronectin Positive (A) Negative   CBC auto differential    Collection Time: 03/26/17  4:27 AM   Result Value Ref Range    WBC 14.05 (H) 3.90 - 12.70 K/uL    RBC 4.25 4.00 - 5.40 M/uL    Hemoglobin 11.5 (L) 12.0 - 16.0 g/dL    Hematocrit 34.2 (L) 37.0 - 48.5 %    MCV 81 (L) 82 - 98 fL    MCH 27.1 27.0 - 31.0 pg    MCHC 33.6 32.0 - 36.0 %    RDW 15.0 (H) 11.5 - 14.5 %    Platelets 212 150 - 350 K/uL    MPV 10.7 9.2 - 12.9 fL    Gran # 10.8 (H) 1.8 - 7.7 K/uL    Lymph # 1.8 1.0 - 4.8 K/uL    Mono # 1.2 (H) 0.3 - 1.0 K/uL    Eos # 0.1 0.0 - 0.5 K/uL    Baso # 0.02 0.00 - 0.20 K/uL    Gran% 77.0 (H) 38.0 - 73.0 %    Lymph% 12.8 (L) 18.0 - 48.0 %    Mono% 8.3 4.0 - 15.0 %    Eosinophil% 0.7 0.0 - 8.0 %    Basophil% 0.1 0.0 - 1.9 %    Differential Method Automated    Comprehensive metabolic panel    Collection Time: 03/26/17   4:27 AM   Result Value Ref Range    Sodium 134 (L) 136 - 145 mmol/L    Potassium 3.3 (L) 3.5 - 5.1 mmol/L    Chloride 105 95 - 110 mmol/L    CO2 19 (L) 23 - 29 mmol/L    Glucose 118 (H) 70 - 110 mg/dL    BUN, Bld 4 (L) 6 - 20 mg/dL    Creatinine 0.6 0.5 - 1.4 mg/dL    Calcium 9.1 8.7 - 10.5 mg/dL    Total Protein 6.4 6.0 - 8.4 g/dL    Albumin 2.6 (L) 3.5 - 5.2 g/dL    Total Bilirubin 0.4 0.1 - 1.0 mg/dL    Alkaline Phosphatase 79 55 - 135 U/L    AST 12 10 - 40 U/L    ALT 12 10 - 44 U/L    Anion Gap 10 8 - 16 mmol/L    eGFR if African American >60 >60 mL/min/1.73 m^2    eGFR if non African American >60 >60 mL/min/1.73 m^2   Type & Screen, Labor & Delivery    Collection Time: 17  4:33 AM   Result Value Ref Range    Group & Rh A POS     Indirect Bentley NEG    POCT glucose    Collection Time: 17 12:16 PM   Result Value Ref Range    POCT Glucose 134 (H) 70 - 110 mg/dL        Assessment:     41 y.o.  female at 26w5d, on IV magnesium sulfate.    Active Hospital Problems    Diagnosis  POA    * premature rupture of membranes, antepartum [O42.919]  Unknown    Threatened  labor [O47.00]  Yes    Diet controlled gestational diabetes mellitus (GDM) in second trimester [O24.410]  Unknown    AMA (advanced maternal age) multigravida 35+ [O09.529]  Yes      Resolved Hospital Problems    Diagnosis Date Resolved POA   No resolved problems to display.        Plan:     - Continue magnesium sulfate, no signs of toxicity at this time  - Close maternal monitoring  - Recheck in 4 hours or PRN    Ashwin Menon

## 2017-03-26 NOTE — PROGRESS NOTES
Magnesium Assessment Note    Subjective:         Jill Kimbrough is a 41 y.o. female at 26w5d now PPROM day #1 on IV MgSO4 for fetal neuroprotection.    Patient reports increasing intensity of contractions that are now q8-10 minutes. Denies CP, denies SOB. Patient reports no nausea/no vomiting.     Objective:      Temp:  [98 °F (36.7 °C)-99.1 °F (37.3 °C)] 98.5 °F (36.9 °C)  Pulse:  [] 107  Resp:  [16-20] 20  SpO2:  [89 %-100 %] 98 %  BP: ()/(50-85) 104/52    I/O last 3 completed shifts:  In: 17.5 [I.V.:17.5]  Out: -   I/O this shift:  In: 844.2 [I.V.:644.2; IV Piggyback:200]  Out: -     General:   alert, appears stated age and cooperative   Lungs:   clear to auscultation bilaterally   Heart::   regular rate and rhythm   Extremities: peripheral pulses normal, no pedal edema, no clubbing or cyanosis   DTRs- 1+  bilaterally     NST: reassuring, Category 1, 145 bpm, moderate BTBV, (+) accelerations, (--) decelerations  TOCO: irritability  - nursing reports difficulty picking up ctx    Lab Review  Recent Results (from the past 24 hour(s))   Fetal fibronectin 26weeks 5days    Collection Time: 03/26/17  3:30 AM   Result Value Ref Range    Fetal Fibronectin Positive (A) Negative   CBC auto differential    Collection Time: 03/26/17  4:27 AM   Result Value Ref Range    WBC 14.05 (H) 3.90 - 12.70 K/uL    RBC 4.25 4.00 - 5.40 M/uL    Hemoglobin 11.5 (L) 12.0 - 16.0 g/dL    Hematocrit 34.2 (L) 37.0 - 48.5 %    MCV 81 (L) 82 - 98 fL    MCH 27.1 27.0 - 31.0 pg    MCHC 33.6 32.0 - 36.0 %    RDW 15.0 (H) 11.5 - 14.5 %    Platelets 212 150 - 350 K/uL    MPV 10.7 9.2 - 12.9 fL    Gran # 10.8 (H) 1.8 - 7.7 K/uL    Lymph # 1.8 1.0 - 4.8 K/uL    Mono # 1.2 (H) 0.3 - 1.0 K/uL    Eos # 0.1 0.0 - 0.5 K/uL    Baso # 0.02 0.00 - 0.20 K/uL    Gran% 77.0 (H) 38.0 - 73.0 %    Lymph% 12.8 (L) 18.0 - 48.0 %    Mono% 8.3 4.0 - 15.0 %    Eosinophil% 0.7 0.0 - 8.0 %    Basophil% 0.1 0.0 - 1.9 %    Differential Method Automated     Comprehensive metabolic panel    Collection Time: 17  4:27 AM   Result Value Ref Range    Sodium 134 (L) 136 - 145 mmol/L    Potassium 3.3 (L) 3.5 - 5.1 mmol/L    Chloride 105 95 - 110 mmol/L    CO2 19 (L) 23 - 29 mmol/L    Glucose 118 (H) 70 - 110 mg/dL    BUN, Bld 4 (L) 6 - 20 mg/dL    Creatinine 0.6 0.5 - 1.4 mg/dL    Calcium 9.1 8.7 - 10.5 mg/dL    Total Protein 6.4 6.0 - 8.4 g/dL    Albumin 2.6 (L) 3.5 - 5.2 g/dL    Total Bilirubin 0.4 0.1 - 1.0 mg/dL    Alkaline Phosphatase 79 55 - 135 U/L    AST 12 10 - 40 U/L    ALT 12 10 - 44 U/L    Anion Gap 10 8 - 16 mmol/L    eGFR if African American >60 >60 mL/min/1.73 m^2    eGFR if non African American >60 >60 mL/min/1.73 m^2   Type & Screen, Labor & Delivery    Collection Time: 17  4:33 AM   Result Value Ref Range    Group & Rh A POS     Indirect Bentley NEG    POCT glucose    Collection Time: 17 12:16 PM   Result Value Ref Range    POCT Glucose 134 (H) 70 - 110 mg/dL        Assessment:         Active Hospital Problems    Diagnosis  POA    * premature rupture of membranes, antepartum [O42.919]  Unknown    Threatened  labor [O47.00]  Yes    Diet controlled gestational diabetes mellitus (GDM) in second trimester [O24.410]  Unknown    AMA (advanced maternal age) multigravida 35+ [O09.529]  Yes      Resolved Hospital Problems    Diagnosis Date Resolved POA   No resolved problems to display.     41 y.o.  female at 26w5d here for threatened PTL, now with PPROM, on IV magnesium sulfate.     Plan:     - Continue magnesium sulfate, no signs of toxicity at this time  - Close maternal monitoring  - s/p Azithro 1g, Amp 2g hanging now  - s/p 1/2 BMZ this morning at OSH, second dose due 3/27 at 0415  - Recheck in 4 hours or PRN    Kamryn Ley MD

## 2017-03-26 NOTE — MEDICAL/APP STUDENT
Magnesium Assessment Note    Subjective:         Jill Kimbrough is a 41 y.o. female at 26w5d on IV MgSO4 for seizure prophylaxis.    Patient reports 2/10 headaches which she attributes to not getting enough sleep, denies blurry vision and denies right upper quadrant pain. Denies CP, denies SOB. Patient reports no nausea/no vomiting.     Objective:      Temp:  [98 °F (36.7 °C)-99.1 °F (37.3 °C)] 98 °F (36.7 °C)  Pulse:  [] 103  Resp:  [16-20] 20  SpO2:  [92 %-100 %] 98 %  BP: ()/(52-85) 115/61    I/O last 3 completed shifts:  In: 17.5 [I.V.:17.5]  Out: -        General:   alert, appears stated age and cooperative   Lungs:   clear to auscultation bilaterally   Heart::   regular rate and rhythm   Extremities: peripheral pulses normal, no pedal edema, no clubbing or cyanosis   DTRs- 1+  bilaterally     NST: reassuring, Category 1, 140 bpm, moderate BTBV, (+) accelerations, (--) decelerations  TOCO: just started feeling contractions      Lab Review  Recent Results (from the past 24 hour(s))   Urinalysis    Collection Time: 03/25/17  2:16 PM   Result Value Ref Range    Specimen UA Urine, Clean Catch     Color, UA Yellow Yellow, Straw, Liyah    Appearance, UA Hazy (A) Clear    pH, UA 6.0 5.0 - 8.0    Specific Gravity, UA 1.010 1.005 - 1.030    Protein, UA Negative Negative    Glucose, UA Negative Negative    Ketones, UA Trace (A) Negative    Bilirubin (UA) Negative Negative    Occult Blood UA Trace (A) Negative    Nitrite, UA Negative Negative    Urobilinogen, UA 1.0 <2.0 EU/dL    Leukocytes, UA Trace (A) Negative   Urinalysis Microscopic    Collection Time: 03/25/17  2:16 PM   Result Value Ref Range    RBC, UA 0 0 - 4 /hpf    WBC, UA 2 0 - 5 /hpf    Microscopic Comment SEE COMMENT    Fetal fibronectin 26weeks 5days    Collection Time: 03/26/17  3:30 AM   Result Value Ref Range    Fetal Fibronectin Positive (A) Negative   CBC auto differential    Collection Time: 03/26/17  4:27 AM   Result Value Ref Range    WBC  14.05 (H) 3.90 - 12.70 K/uL    RBC 4.25 4.00 - 5.40 M/uL    Hemoglobin 11.5 (L) 12.0 - 16.0 g/dL    Hematocrit 34.2 (L) 37.0 - 48.5 %    MCV 81 (L) 82 - 98 fL    MCH 27.1 27.0 - 31.0 pg    MCHC 33.6 32.0 - 36.0 %    RDW 15.0 (H) 11.5 - 14.5 %    Platelets 212 150 - 350 K/uL    MPV 10.7 9.2 - 12.9 fL    Gran # 10.8 (H) 1.8 - 7.7 K/uL    Lymph # 1.8 1.0 - 4.8 K/uL    Mono # 1.2 (H) 0.3 - 1.0 K/uL    Eos # 0.1 0.0 - 0.5 K/uL    Baso # 0.02 0.00 - 0.20 K/uL    Gran% 77.0 (H) 38.0 - 73.0 %    Lymph% 12.8 (L) 18.0 - 48.0 %    Mono% 8.3 4.0 - 15.0 %    Eosinophil% 0.7 0.0 - 8.0 %    Basophil% 0.1 0.0 - 1.9 %    Differential Method Automated    Comprehensive metabolic panel    Collection Time: 17  4:27 AM   Result Value Ref Range    Sodium 134 (L) 136 - 145 mmol/L    Potassium 3.3 (L) 3.5 - 5.1 mmol/L    Chloride 105 95 - 110 mmol/L    CO2 19 (L) 23 - 29 mmol/L    Glucose 118 (H) 70 - 110 mg/dL    BUN, Bld 4 (L) 6 - 20 mg/dL    Creatinine 0.6 0.5 - 1.4 mg/dL    Calcium 9.1 8.7 - 10.5 mg/dL    Total Protein 6.4 6.0 - 8.4 g/dL    Albumin 2.6 (L) 3.5 - 5.2 g/dL    Total Bilirubin 0.4 0.1 - 1.0 mg/dL    Alkaline Phosphatase 79 55 - 135 U/L    AST 12 10 - 40 U/L    ALT 12 10 - 44 U/L    Anion Gap 10 8 - 16 mmol/L    eGFR if African American >60 >60 mL/min/1.73 m^2    eGFR if non African American >60 >60 mL/min/1.73 m^2   Type & Screen, Labor & Delivery    Collection Time: 17  4:33 AM   Result Value Ref Range    Group & Rh A POS     Indirect [place] NEG    POCT glucose    Collection Time: 17 12:16 PM   Result Value Ref Range    POCT Glucose 134 (H) 70 - 110 mg/dL        Assessment:     41 y.o.  female at 26w5d, on IV magnesium sulfate.    Active Hospital Problems    Diagnosis  POA    *Threatened  labor [O47.00]  Yes      Resolved Hospital Problems    Diagnosis Date Resolved POA   No resolved problems to display.        Plan:     - Continue magnesium sulfate, no signs of toxicity at this time  -  Close maternal monitoring including UOP and BP   - BP: normal to mild range last 4 hrs (115/61 @ 1230)   - UOP: N/A  - Recheck in 4 hours or PRN    Ashwin Menon

## 2017-03-26 NOTE — PLAN OF CARE
0305 Pt arrived walking accompanied by family members,with c/o contractions. States was here earlier today and they sent her home. Pt gowned and urine specimen obtained.  0320 pt on EFM for assessment of contractions and FH, head to toe assessment done, Fetal fibrinectin obtained and SVE done.  0338 Dr Jacome paged.  0345m Dr Jacome called informed of pts arrival with c/o contractions. Informed of contraction pattern, FH and SVE results orders received.  0505 Report given to EMS.  0510 pt discharged via EMS personnel.

## 2017-03-26 NOTE — PROGRESS NOTES
4:28 AM Dr. Jacome called to stay pt is being accepted at [facility] [place] [place] by [facility] by Dr. Nat Taylor.  Added order for type & screen and Pen G 5 million units.

## 2017-03-26 NOTE — H&P
History and Physical                                            Obstetrics          Subjective:       Jill Kimbrough is a 41 y.o.  female with IUP at 26w5d weeks gestation who presents as transport from [facility] for higher level of care in setting of threatened PTL.  Patient states she started feeling increased pressure and pain yesterday.  She initially presented to OSH ER and was ruled out for ROM or labor. She presented again later than day and was found to be mert on the monitor.  Patient states she was feeling contractions about every 3-5 minutes. She was found to have made cervical change from 1 cm to 3-3.5 cm. She was then transferred to [facility] in order to have higher level of care for infant.  Patient states her contractions have spaced, are now about every 9-10 minutes apart.  She otherwise denies vaginal bleeding, denies LOF. She reports good FM.     This IUP is complicated by AMA, grandmultip  x5, and GDM.  Patient states her GDM is diet controlled with blood sugars usually between . She reports keeping a blood sugar log and states only a few measurements are in the 140s.     PMHx: No past medical history on file.    PSHx: No past surgical history on file.    All: Review of patient's allergies indicates:  No Known Allergies    Meds:   Prescriptions Prior to Admission   Medication Sig Dispense Refill Last Dose    PNV NO.115/IRON FUMARATE/FA (PRENATAL #115-IRON-FOLIC ACID ORAL) Take by mouth.   Taking       SH:   Social History     Social History    Marital status: Single     Spouse name: N/A    Number of children: N/A    Years of education: N/A     Occupational History    Not on file.     Social History Main Topics    Smoking status: Never Smoker    Smokeless tobacco: Not on file    Alcohol use No    Drug use: No    Sexual activity: Yes     Partners: Male     Birth control/ protection: None     Other Topics Concern    Not on file     Social History Narrative        FH:   Family History   Problem Relation Age of Onset    Diabetes Maternal Grandmother     Diabetes Mother     Diabetes Brother     Cataracts Brother     Breast cancer Neg Hx     Colon cancer Neg Hx     Ovarian cancer Neg Hx     Glaucoma Neg Hx     Macular degeneration Neg Hx     Retinal detachment Neg Hx     Strabismus Neg Hx     Amblyopia Neg Hx     Blindness Neg Hx        OBHx:   Obstetric History       T5      TAB0   SAB1   E0   M0   L5       # Outcome Date GA Lbr Yasmany/2nd Weight Sex Delivery Anes PTL Lv   7 Current            6 Term 01/27/15 39w0d  3.6 kg (7 lb 15 oz) F Vag-Spont EPI N Y   5 Term 09/20/10 40w0d  3.175 kg (7 lb) M Vag-Spont EPI N Y   4 Term 09/15/04 40w0d  3.175 kg (7 lb) F Vag-Spont EPI N Y   3 2003 12w0d          2 Term 99 40w0d  3.402 kg (7 lb 8 oz) F Vag-Spont None N Y   1 Term 94 40w0d  3.629 kg (8 lb) F Vag-Spont None N Y              Objective:       LMP 2016    Temp:  [99.1 °F (37.3 °C)] 99.1 °F (37.3 °C)  Pulse:  [] 111  SpO2:  [92 %-100 %] 97 %  BP: (106-143)/(58-72) 128/60    General:   alert, appears stated age and cooperative   Lungs:   clear to auscultation bilaterally   Heart:   regular rate and rhythm, S1, S2 normal, no murmur, click, rub or gallop   Abdomen:  soft, non-tender; bowel sounds normal; no masses,  no organomegaly   Extremities negative edema, negative erythema     Cervical exam: 3.5/90/-3      Fetal Heart Tones:  NST: reassuring, 145 bpm, moderate BTBV, (+) accelerations, (--) decelerations  TOCO: no contractions      Ultrasound:  cephalic        Lab Review  Blood Type A POS  GBBS: unk  Rubella: Immune  RPR: -  HIV: negative  HepB: negative    Other Labs: FFN+       Assessment:       26w5d weeks gestation admitted for Threatened  labor    Active Hospital Problems    Diagnosis  POA    *Threatened  labor [O47.00]  Yes      Resolved Hospital Problems    Diagnosis Date Resolved POA   No resolved  problems to display.          Plan:          Threatened  labor  - Admit to Labor and Delivery unit  - Consents for delivery including vaginal or  section and blood transfusion signed and to chart  - Risks, benefits, alternatives and possible complications have been discussed in detail with the patient.   - Epidural per Anesthesia  - Draw CBC, T&S  - start Mg for neuroprotection  - start PCN for GBS unknown  - s/p BMZ /2 3/26, 2nd dose in 24 hrs  - s/p terb x1   - NPO, contiuous      GDM  - pattern blood sugars        Post-Partum Hemorrhage risk - medium    Navya Bueno MD

## 2017-03-26 NOTE — IP AVS SNAPSHOT
[facility] Location ([facility])  [address] [facility] [place] [address]  Phone: [phone]           Instrucciones de [place] de Pacientes  Nuestra meta es prepararlo para el éxito. Gi paquete incluye información sobre ybarra condición, medicamentos e instrucciones para cuidados en el hogar. [place] le ayudará a cuidarse y prevenir la necesidad de volver al hospital.     Por favor, hable con ybarra enfermero o enfermera si tiene alguna pregunta..     Hay muchos detalles a recordar cuando se prepara para salir del hospital. [place] es lo que necesita hacer:    1. [place] ybarra medicina. Si usted tiene celi receta, revise la lista de medicamentos en las siguientes páginas. Es posible que tenga nuevos medicamentos para recoger en la farmacia y otros que tendrá que dejar de danielle. Revise las instrucciones sobre cómo y cuándo danielle lamont medicamentos. Consulte con el médico o el enfermeras si no está seguro de qué hacer.    2. Ir a lamont citas de seguimiento. La información específica de seguimiento aparece en las siguientes páginas. Usted puede ser contactado por celi enfermera o proveedor clínico sobre las próximas citas. Estar seguro de que tiene todos los números de teléfono para comunicarse si es necesario. Por favor, póngase en contacto con la oficina de ybarra profesional médico si no puede hacer celi eva.     3. Esté atento a señales de advertencia. El médico o la enfermera le dará señales de alarma detallados que debe observar y cuándo llamar para la ayuda. Estas instrucciones también pueden incluir información educativa acerca de ybarra condición. Si usted experimenta cualquiera de las señales de advertencia para ybarra catrachito, llame a ybarra médico.             [facility]    Si ybarra médico no le ha indicado a lo contrário, por favor   contactar a [facility] [place], nuestra línea de cuidado de enfermeras está disponible para asistirle 24/7.    [phone] (servicio gratuito)    Enfermeras registradas de [facility] pueden ayudarle a  reservar celi eva, proveer educación para la catrachito, asesoría clínica, y otros servicios de asesoramiento.                  ** Verificar la lista de medicamentos es correcta y está actualizada. Llevar esto con usted en tyler de emergencia. Si lamont medicamentos butterfield cambiado, por favor notifique a ybarra proveedor de atención médica.             Lista de medicamentos      EMPIECE a danielle estos medicamentos        Additional Info                      ibuprofen 600 MG tablet   También conocido chuck:  ADVILMOTRIN   Cantidad:  30 tablet   Recargas:  2   Dosis:  600 mg    Última administración:  600 mg on 3/30/2017  5:21 AM   Instrucciones:  Take 1 tablet (600 mg total) by mouth every 6 (six) hours.     Begin Date    AM    Noon    PM    Bedtime       oxycodone-acetaminophen 5-325 mg per tablet   También conocido chuck:  PERCOCET   Cantidad:  20 tablet   Recargas:  0   Dosis:  1 tablet    Última administración:  1 tablet on 3/30/2017  8:12 AM   Instrucciones:  Take 1 tablet by mouth every 4 (four) hours as needed.     Begin Date    AM    Noon    PM    Bedtime         SIGA tomando estos medicamentos        Additional Info                      PRENATAL #115-IRON-FOLIC ACID ORAL   Recargas:  0    Instrucciones:  Take by mouth.     Begin Date    AM    Noon    PM    Bedtime            Dónde puede recoger lamont medicamentos      Estos medicamentos fueron enviados a [facility] [id number] - [place] - [address] AT [facility] [address]  [address] [place] [address]    Horas:  24-horas Teléfono:  [phone]     ibuprofen 600 MG tablet         Puede obtener estos medicamentos en cualquier farmacia     Traiga celi receta en papel para cada abby de estos medicamentos     oxycodone-acetaminophen 5-325 mg per tablet                  Por favor traiga a todos las citas de seguimiento:    1. Celi copia de las instrucciones de derick.  2. Todos los medicamentos que está tomando malgorzata momento, en lamont envases  originales.  3. Identificación y tarjeta de seguro.    Por favor llegue 15 minutos antes de la hora de la eva.    Por favor llame con 24 horas de antelación si tiene que cambiar ybarra eva y / o tiempo.        Información de seguimiento     Realice un seguimiento con:  Floyd Jacome MD    Cuándo:  5/11/2017    Especialidad:  Obstetrics and Gynecology    Por qué:  post partum    Información de contacto:    [address]  [place] [address]  [phone]          Instrucciones de derick     Órdenes Futuras    Glucose Tolerance 2 Hour     Comentarios:    Schedule 6 weeks after delivery    Activity as tolerated     Instrucciones de planificación:    Pelvic rest (nothing in the vagina) until 6 week postpartum checkup.    Call MD for:  difficulty breathing or increased cough     Call MD for:  increased confusion or weakness     Call MD for:  persistent dizziness, light-headedness, or visual disturbances     Call MD for:  persistent nausea and vomiting or diarrhea     Call MD for:  redness, tenderness, or signs of infection (pain, swelling, redness, odor or green/yellow discharge around incision site)     Call MD for:  severe persistent headache     Call MD for:  severe uncontrolled pain     Call MD for:  temperature >100.4     Diet general     Preguntas:    Total calories:      Fat restriction, if any:      Protein restriction, if any:      Na restriction, if any:      Fluid restriction:      Additional restrictions:          Instrucciones a johnathan de derick       Preparation and Hygiene:    1. Shower daily.  2. Wear a clean bra each day and wash daily in warm soapy water.  3. Change wet or moist breast pads frequently.  Moist pads can promote growth of germs.  4. Actively wash your hands, paying close attention to the area around and under your fingernails, thoroughly with soap and water for 15 seconds before pumping or handling your milk.  Re-wash your hands if you touch anything (scratching your nose, answering the  phone, etc) while pumping or handling your milk.   5. Before pumping your breasts, assemble the pump collection kit and have ready the sterile container and labels.  Place these items on a clean surface next to the breastpump.  6. Each time after you have finished pumping, take apart all of the parts of the breastpump collection kit and place them in a separate cleaning container (do not place them in the sink).  Be sure to remove the yellow valve from the breastshield and separate the white membrane from the yellow valve.  Rinse all of these parts with cool water.  Then use a new sponge and/or bottle brush and dishwashing detergent to clean the parts.  Rinse off the soapy water with cool water and air dry on a clean towel covered with a clean cloth.  All parts may also be washed after each use in the top rack of a .  7. Once each day, sterilize all of the parts of the breastpump collection kit.  This can be done by boiling the kit parts for 10 minutes or by using a Quick Clean Micro-Steam Bag made by [employer]  8. If condensation appears in the tubing, continue to run the pump with the tubing attached for 1-2 minutes or until the tubing is dry.   9. Notify your babys nurse or doctor if you become ill or need to take any medication, even over-the-counter medicines.        Collection and Storage of Expressed Breastmilk:         1. Pump your breasts at least 8-10 times every 24 hours.  Double pump (both breasts at  the same time) for at least 15-20 minutes using the most suction that is comfortable.    2. Write the date and time of pumping and the name of any medications you are takingon the babys pre-printed hospital identification label.   3. Place your babys pre-printed hospital identification label on each container of breastmilk.  Additional pre-printed labels can be obtained from your babys nurse.  If your expressed breastmilk is not correctly labeled, the nurse cannot feed the milk to the baby.        4. Place a brightly colored sticker on the top of each container of milk pumped during the first 30 days.  This identifies the milk as special and having higher levels of nutrients and anti-infective properties that are so important for your baby.  Additional stickers can be obtained from the lactation consultants or your babys nurse.  5.   Do not touch the inside of the storage containers or lids.  6.      Pour the amount of expressed milk needed for 1 of your babys feedings into each   storage container. Use a new container(s) for each pumping.  Additional storage   containers can be obtained from your babys nurse.        7.       Tightly screw the lid onto the container and place immediately into the       refrigerator fordaily transportation to the hospital.   Do not freeze your milk      unless asked to do so by your babys nurse.  However, if you are not able to      visit your baby each day, place the expressed breastmilk in the freezer.  8.   Expressed breastmilk should be refrigerated or frozen within 1 hour of      pumping.  9.      Do not store expressed breastmilk on the door of your refrigerator or freezer where the temperature is warmer.         Transportation of Expressed Breastmilk:    1. Refrigerated breastmilk or frozen milk should be packed tightly together in a cooler with frozen, blue gel-packs to keep the milk frozen.  DO NOT USE ICE CUBES (WET ICE) TO TRANSPORT FROZEN MILK.   A clean towel can be used to fill any extra space between containers of frozen milk.  2.    Bring your expressed milk from home each time you visit the baby.                          Primary Diagnosis     Bolaños diagnosis primaria es:  Normal Vaginal Delivery      Información de Admisión     Fecha y hora Proveedor Departamento [facility]    3/26/2017  5:56 AM Nat Taylor MD [facility] [id number]      Los proveedores de cuidado     Personal Médico Rol Especialidad Teléfono principal de la oficina     Nat Taylor MD Attending Provider Obstetrics [phone]    Letha Tanner MD Consulting Physician  Obstetrics and Gynecology [phone]    Sha Jett III, MD Surgeon  Obstetrics [phone]      Regla signos vitales isabel     PS Pulso Temperatura Resp [place] Peso    118/69 66 98.5 °F (36.9 °C) (Oral) 18 5' (1.524 m) 85.7 kg (188 lb 15 oz)    Ultima menstruación SpO2 BMI (IM)             09/20/2016 98% 36.9 kg/m2         Recent Lab Values        12/13/2016 3/27/2017                        9:11 AM  1:10 PM          A1C 5.5 5.8          Comment for A1C at  9:11 AM on 12/13/2016:  According to ADA guidelines, hemoglobin A1C <7.0% represents  optimal control in non-pregnant diabetic patients.  Different  metrics may apply to specific populations.   Standards of Medical Care in Diabetes - 2016.  For the purpose of screening for the presence of diabetes:  <5.7%     Consistent with the absence of diabetes  5.7-6.4%  Consistent with increasing risk for diabetes   (prediabetes)  >or=6.5%  Consistent with diabetes  Currently no consensus exists for use of hemoglobin A1C  for diagnosis of diabetes for children.      Comment for A1C at  1:10 PM on 3/27/2017:  According to ADA guidelines, hemoglobin A1C <7.0% represents  optimal control in non-pregnant diabetic patients.  Different  metrics may apply to specific populations.   Standards of Medical Care in Diabetes - 2016.  For the purpose of screening for the presence of diabetes:  <5.7%     Consistent with the absence of diabetes  5.7-6.4%  Consistent with increasing risk for diabetes   (prediabetes)  >or=6.5%  Consistent with diabetes  Currently no consensus exists for use of hemoglobin A1C  for diagnosis of diabetes for children.        Pending Labs     Order Current Status    Specimen to Pathology - Surgery In process    Specimen to Pathology - Surgery In process      Alergias     A partir del:  3/30/2017        No Known Allergies      Directiva Anticipada      Adina directiva anticipada es un documento que, en tyler de que ya no capaz de hacer decisiones por sí mismo, le dice a ybarra equipo médico qué tipo de tratamiento quiere o no quiere recibir, o que le gustaría danielle esas decisiones para usted . Si actualmente no tiene adina directiva anticipada, [facility] le anima a crear abby. Para más información llame al: [phone]), [phone] ([phone]), o entrando en [email or web address].        Language Assistance Services     ATTENTION: Language assistance services are available, free of charge. Please call [phone].      ATENCIÓN: Si habla español, tiene a ybarra disposición servicios gratuitos de asistencia lingüística. Llame al [phone].     CHÚ Ý: N?u b?n nói Ti?ng Vi?t, có các d?ch v? h? tr? ngôn ng? mi?n phí dành cho b?n. G?i s? [phone].        Instrucciones de derick para la diabetes       Diabetes (Información general)  La diabetes es un problema de catrachito a artie plazo que significa que ybarra cuerpo no produce la suficiente insulina. O también puede significar que ybarra cuerpo no puede utilizar la insulina que produce. La insulina es adina hormona en ybarra organismo, que permite que el azúcar en la tom (glucosa) llegue a las células en ybarra cuerpo. Todas lamont células necesitan glucosa chuck combustible.  Cuando usted tiene diabetes la glucosa en ybarra cuerpo se acumula porque no puede llegar hasta las células. Esta acumulación se conoce chuck un nivel alto de azúcar en la tom (hiperglucemia).  Ybarra nivel de azúcar en la tom depende de varias cosas. Depende de la clase de alimentos que usted come y de cuánto come. También depende de cuánto ejercicio hace y de cuánta insulina tiene en ybarra organismo. [place] mucho de las clases indebidas de alimentos o no danielle a tiempo los medicamentos para la diabetes puede causar con el tiempo un nivel alto de azúcar en la tom. Las infecciones pueden provocar un nivel alto de azúcar en la tom, incluso si está  tomando lamont medicamentos correctamente.  Estas cosas también pueden causar un nivel bajo de azúcar en la tom:  · Saltarse las comidas  · No comer suficientes alimentos  · Kamari demasiado de un medicamento para la diabetes  Con el tiempo, si no se trata la diabetes puede causar problemas serios. Estos problemas incluyen enfermedades del corazón, ataque cerebral, insuficiencia renal y ceguera. También pueden incluir dolor en los nervios o pérdida de sensación en lamont piernas o pies. Al mantener ybarra azúcar en la tom bajo control usted puede prevenir o retrasar estos problemas.  Los niveles normales en la tom son de 80 a 130 antes de las comidas y menos de 180 de 1 a 2 horas después de comer.    Cuidados en el hogar  Cuando se esté cuidando usted mismo en ybarra hogar, siga estas pautas:  · Siga la dieta que le recomiende ybarra proveedor de atención médica.  Use la insulina o cualquier otro medicamento para la diabetes, exactamente chuck se lo indiquen.  · Vigile lamont niveles de azúcar en la tom chuck le indiquen. Lleve un registro con los resultados. [place] le ayudará a ybarra proveedor a cambiar lamont medicamentos para mantener el azúcar en la tom bajo control.   · Trate de alcanzar ybarra peso ideal. Es posible que pueda reducir o dejar de kamari medicamentos para la diabetes si come los alimentos adecuados y hace ejercicio.  · No fume. Fumar hace que los efectos de la diabetes empeoren en ybarra circulación. Si usted fuma y tiene diabetes es mucho más probable que sufra un ataque al corazón.   · Cuídese sandip lamont pies. Si vila perdido la sensación en lamont pies, es posible que no [place] celi herida o celi infección. Revise lamont pies y la jaylen entre los dedos por lo menos celi vez a la semana.    · Utilice ybarra brazalete de alerta médica o lleve celi tarjeta en ybarra billetera que diga que usted tiene diabetes. [place] les ayudará a los proveedores de atención médica a brindarle los cuidados adecuados si usted se enferma hasta el punto que no pueda  decirles que tiene diabetes.  Plan para un día de enfermedad  Si usted contrae un resfriado, la gripe o ecli infección viral, siga estos pasos:  · Revise ybarra plan para un día de enfermedad de la diabetes y llame a ybarra proveedor de atención médica chuck le indicaron que lo hiciera. Es posible que deba llamar al proveedor de inmediato si:  ¨ Ybarra nivel de azúcar en la tom está por encima de 240 a pesar de estar tomando los medicamentos para la diabetes  ¨ Los niveles de cetonas en ybarra orina están por encima de lo normal o altos  ¨ Ha estado vomitando robert más de 6 horas  ¨ Tiene dificultades para respirar  ¨ Tiene celi fiebre derick  ¨ Tiene fiebre robert varios días y no mejora  ¨ Se siente más aturdido o somnoliento de lo usual  · Siga tomando lamont pastillas para la diabetes (medicamentos orales) incluso si ha estado vomitando y se siente muy enfermo. Llame a ybarra proveedor de inmediato porque es posible que necesite insulina para bajar los niveles de azúcar en la tom hasta que se recupere de ybarra enfermedad.    · Monitoree ybarra insulina incluso si ha estado vomitando y se siente muy enfermo. Llame a ybarra proveedor de inmediato y pregunte si necesita cambiar ybarra dosis de insulina. [place] dependerá de los resultados de lamont niveles de azúcar en la tom.   · Revise ybarra nivel de azúcar en la tom cada 2 a 4 horas, o por lo menos 4 veces al día.  · Revise con frecuencia lamont cetonas. Si está vomitando y tiene diarrea, revíselas con más frecuencia.  · No se salte comidas. Trate de comer comidas pequeñas a intervalos regulares. Hágalo aunque no sienta gana de comer.  · Bridgette agua y otros líquidos que no contengan cafeína o calorías. [place] prevendrá que se deshidrate. Si tiene náuseas o vómito, tome pequeños sorbos cada 5 minutos. Para prevenir la deshidratación trate de beber celi taza (8 onzas) de líquido cada hora mientras esté despierto.  Cuidados generales  Siempre lleve con usted celi winsome de azúcar de acción rápida para el tyler  de que tenga síntomas de un nivel bajo de azúcar en la tom (menos de 70). A las primeras señales de un nivel bajo de azúcar en la tom, coma o garfield de 15 a 20 gramos de azúcar de acción rápida para elevar el nivel de azúcar en la tom. Algunos ejemplos son:    · 3 a 4 tabletas de glucosa. Estas pueden comprarse en la mayoría de las farmacias.  · 4 onzas (1/2 taza) de un refresco (que no sea de dieta)  · 4 onzas (1/2 taza) de cualquier jugo de fruta  · 8 onzas (1 taza) de leche  · 5 a 6 unidades de caramelos duros  · 1 cucharada de miel  Revise ybarra nivel de azúcar en la tom 15 minutos después de tratarse. Si sigue por debajo de 70, tome de 15 a 20 gramos más de azúcar de acción rápida. Vuelva a revisarse en 15 minutos. Si regresa a lo normal (70 o más), coma un bocadillo o celi comida para mantener el azúcar en la tom dentro de un nivel seguro. Si permanece bajo, llame a ybarra médico o vaya a celi tristin de emergencias.  Cuidado de seguimiento  [place] celi eva de seguimiento con ybarra proveedor de atención médica, o chuck le indiquen. Para más información acerca de la diabetes, visite la página web de la Asociación Americana de la Diabetes (American Diabetes Association) en www.diabetes.org o llame al [phone].  Cuándo buscar consejo médico  Llame a ybarra proveedor de atención médica de inmediato si tiene cualquiera de estos síntomas de un nivel alto de azúcar en la tom:    · Orina frecuentemente  · Mareos  · Somnolencia  · Sed  · Dolor de vane  · Náuseas o vómito  · Dolor abdominal  · Cambios en la vista  · Respiración rápida  · Confusión o pérdida del conocimiento  También llame a ybarra proveedor de inmediato si tiene alguna de estas señales de un nivel bajo de azúcar en la tom:    · Fatiga  · Dolor de vane  · Temblores  · Sudoración excesiva  · Hambre  · Se siente ansioso o inquieto  · Cambios en la vista  · Somnolencia  · Debilidad  · Confusión o pérdida del conocimiento  Llame a ybarra proveedor de inmediato  si cualquiera de estos ocurre:  · Dolor en el pecho o falta de aire  · Mareos o desmayos  · Debilidad en un brazo o celi pierna, o en un lado de la eloise  · Dificultad para hablar o para georgina   Date Last Reviewed: 6/1/2016  © 0824-1400 [employer]. [address] [place] [id number]. Todos los derechos reservados. Esta información no pretende sustituir la atención médica profesional. Sólo ybarra médico puede diagnosticar y tratar un problema de catrachito.              Registrarse para [facility]     La activación de ybarra cuenta [facility] es tan fácil chuck 1-2-3!    1) Ir a my.[facility].[place], seleccione Registrarse Ahora, meter el código de activación y ybarra fecha de nacimiento, y seleccione Próximo.    [id number]  Expires: 5/9/2017  2:22 PM      2) Crear un nombre de usuario y contraseña para usar cuando se visita [facility] en el futuro y selecciona celi pregunta de seguridad en tyler de que pierda ybarra contraseña y seleccione Próximo.    3) Introduzca ybarra dirección de correo electrónico y mary clic en Registrarse!    Información Adicional  Si tiene alguna pregunta, por favor, e-mail [email or web address] o llame al [phone] para hablar con nuestro personal. Recuerde, [facility] no debe ser usada para necesidades urgentes. En tyler de emergencia médica, llame al [phone].         [facility] cumple con las leyes federales aplicables de derechos civiles y no discrimina por motivos de quinn, color, origen nacional, edad, discapacidad, o sexo.                        [facility] Location ([facility])  [address] [place] [address] [place] [address]  Phone: [phone]           Patient Discharge Instructions   Our goal is to set you up for success. This packet includes information on your condition, medications, and your home care.  It will help you care for yourself to prevent having to return to the hospital.     Please ask your nurse if you have any questions.      There are many details to remember when  preparing to leave the hospital. Here is what you will need to do:    1. Take your medicine. If you are prescribed medications, review your Medication List on the following pages. You may have new medications to  at the pharmacy and others that you'll need to stop taking. Review the instructions for how and when to take your medications. Talk with your doctor or nurses if you are unsure of what to do.     2. Go to your follow-up appointments. Specific follow-up information is listed in the following pages. Your may be contacted by a nurse or clinical provider about future appointments. Be sure we have all of the phone numbers to reach you. Please contact your provider's office if you are unable to make an appointment.     3. Watch for warning signs. Your doctor or nurse will give you detailed warning signs to watch for and when to call for assistance. These instructions may also include educational information about your condition. If you experience any of warning signs to your health, call your doctor.           [facility]  Unless otherwise directed by your provider, please   contact [facility], our nurse care line   that is available for 24/7 assistance.     [phone] (toll-free)     Registered nurses in the [facility]   provide: appointment scheduling, clinical advisement, health education, and other advisory services.              ** Verificar la lista de medicamentos es correcta y está actualizada. Llevar esto con usted en tyler de emergencia. Si lamont medicamentos butterfield cambiado, por favor notifique a ybarra proveedor de atención médica.             Medication List      START taking these medications        Additional Info                      ibuprofen 600 MG tablet   Commonly known as:  ADVILMOTRIN   Quantity:  30 tablet   Refills:  2   Dose:  600 mg    Last time this was given:  600 mg on 3/30/2017  5:21 AM   Instructions:  Take 1 tablet (600 mg total) by mouth every 6 (six) hours.      Begin Date    AM    Noon    PM    Bedtime       oxycodone-acetaminophen 5-325 mg per tablet   Commonly known as:  PERCOCET   Quantity:  20 tablet   Refills:  0   Dose:  1 tablet    Last time this was given:  1 tablet on 3/30/2017  8:12 AM   Instructions:  Take 1 tablet by mouth every 4 (four) hours as needed.     Begin Date    AM    Noon    PM    Bedtime         CONTINUE taking these medications        Additional Info                      PRENATAL #115-IRON-FOLIC ACID ORAL   Refills:  0    Instructions:  Take by mouth.     Begin Date    AM    Noon    PM    Bedtime            Where to Get Your Medications      These medications were sent to [id number] - [place], [place] - [address] AT [facility] [address]  [address], [place] [address]    Hours:  24-hours Phone:  [phone]     ibuprofen 600 MG tablet         You can get these medications from any pharmacy     Bring a paper prescription for each of these medications     oxycodone-acetaminophen 5-325 mg per tablet                  Por favor traiga a todos las citas de seguimiento:    1. Adina copia de las instrucciones de derick.  2. Todos los medicamentos que está tomando malgorzata momento, en lamont envases originales.  3. Identificación y tarjeta de seguro.    Por favor llegue 15 minutos antes de la hora de la eva.    Por favor llame con 24 horas de antelación si tiene que cambiar ybarra eva y / o tiempo.        Follow-up Information     Follow up with Floyd Jacome MD In 6 weeks.    Specialty:  Obstetrics and Gynecology    Why:  post partum    Contact information:    [address]  [place] [id number] [phone]          Discharge Instructions     Future Orders    Glucose Tolerance 2 Hour     Comments:    Schedule 6 weeks after delivery    Activity as tolerated     Scheduling Instructions:    Pelvic rest (nothing in the vagina) until 6 week postpartum checkup.    Call MD for:  difficulty breathing or increased  cough     Call MD for:  increased confusion or weakness     Call MD for:  persistent dizziness, light-headedness, or visual disturbances     Call MD for:  persistent nausea and vomiting or diarrhea     Call MD for:  redness, tenderness, or signs of infection (pain, swelling, redness, odor or green/yellow discharge around incision site)     Call MD for:  severe persistent headache     Call MD for:  severe uncontrolled pain     Call MD for:  temperature >100.4     Diet general     Questions:    Total calories:      Fat restriction, if any:      Protein restriction, if any:      Na restriction, if any:      Fluid restriction:      Additional restrictions:          Discharge Instructions       Preparation and Hygiene:    1. Shower daily.  2. Wear a clean bra each day and wash daily in warm soapy water.  3. Change wet or moist breast pads frequently.  Moist pads can promote growth of germs.  4. Actively wash your hands, paying close attention to the area around and under your fingernails, thoroughly with soap and water for 15 seconds before pumping or handling your milk.  Re-wash your hands if you touch anything (scratching your nose, answering the phone, etc) while pumping or handling your milk.   5. Before pumping your breasts, assemble the pump collection kit and have ready the sterile container and labels.  Place these items on a clean surface next to the breastpump.  6. Each time after you have finished pumping, take apart all of the parts of the breastpump collection kit and place them in a separate cleaning container (do not place them in the sink).  Be sure to remove the yellow valve from the breastshield and separate the white membrane from the yellow valve.  Rinse all of these parts with cool water.  Then use a new sponge and/or bottle brush and dishwashing detergent to clean the parts.  Rinse off the soapy water with cool water and air dry on a clean towel covered with a clean cloth.  All parts may also be  washed after each use in the top rack of a .  7. Once each day, sterilize all of the parts of the breastpump collection kit.  This can be done by boiling the kit parts for 10 minutes or by using a Quick Clean Micro-Steam Bag made by [employer]  8. If condensation appears in the tubing, continue to run the pump with the tubing attached for 1-2 minutes or until the tubing is dry.   9. Notify your babys nurse or doctor if you become ill or need to take any medication, even over-the-counter medicines.        Collection and Storage of Expressed Breastmilk:         1. Pump your breasts at least 8-10 times every 24 hours.  Double pump (both breasts at  the same time) for at least 15-20 minutes using the most suction that is comfortable.    2. Write the date and time of pumping and the name of any medications you are takingon the babys pre-printed hospital identification label.   3. Place your babys pre-printed hospital identification label on each container of breastmilk.  Additional pre-printed labels can be obtained from your babys nurse.  If your expressed breastmilk is not correctly labeled, the nurse cannot feed the milk to the baby.       4. Place a brightly colored sticker on the top of each container of milk pumped during the first 30 days.  This identifies the milk as special and having higher levels of nutrients and anti-infective properties that are so important for your baby.  Additional stickers can be obtained from the lactation consultants or your babys nurse.  5.   Do not touch the inside of the storage containers or lids.  6.      Pour the amount of expressed milk needed for 1 of your babys feedings into each   storage container. Use a new container(s) for each pumping.  Additional storage   containers can be obtained from your babys nurse.        7.       Tightly screw the lid onto the container and place immediately into the       refrigerator fordaily transportation to the hospital.    Do not freeze your milk      unless asked to do so by your babys nurse.  However, if you are not able to      visit your baby each day, place the expressed breastmilk in the freezer.  8.   Expressed breastmilk should be refrigerated or frozen within 1 hour of      pumping.  9.      Do not store expressed breastmilk on the door of your refrigerator or freezer where the temperature is warmer.         Transportation of Expressed Breastmilk:    1. Refrigerated breastmilk or frozen milk should be packed tightly together in a cooler with frozen, blue gel-packs to keep the milk frozen.  DO NOT USE ICE CUBES (WET ICE) TO TRANSPORT FROZEN MILK.   A clean towel can be used to fill any extra space between containers of frozen milk.  2.    Bring your expressed milk from home each time you visit the baby.                          Primary Diagnosis     Your primary diagnosis was:  Normal Vaginal Delivery      Admission Information     Date & Time Provider Department CSN    3/26/2017  5:56 AM Nat Taylor MD [facility] [id number]      Care Providers     Provider Role Specialty Primary office phone    Nat Taylor MD Attending Provider Obstetrics [phone]    Letha Tanner MD Consulting Physician  Obstetrics and Gynecology [phone]    Sha Jett III, MD Surgeon  Obstetrics [phone]      Your Vitals Were     BP Pulse Temp Resp Height Weight    118/69 66 98.5 °F (36.9 °C) (Oral) 18 5' (1.524 m) 85.7 kg (188 lb 15 oz)    Last Period SpO2 BMI             09/20/2016 98% 36.9 kg/m2         Recent Lab Values        12/13/2016 3/27/2017                        9:11 AM  1:10 PM          A1C 5.5 5.8          Comment for A1C at  9:11 AM on 12/13/2016:  According to ADA guidelines, hemoglobin A1C <7.0% represents  optimal control in non-pregnant diabetic patients.  Different  metrics may apply to specific populations.   Standards of Medical Care in Diabetes - 2016.  For the purpose of  screening for the presence of diabetes:  <5.7%     Consistent with the absence of diabetes  5.7-6.4%  Consistent with increasing risk for diabetes   (prediabetes)  >or=6.5%  Consistent with diabetes  Currently no consensus exists for use of hemoglobin A1C  for diagnosis of diabetes for children.      Comment for A1C at  1:10 PM on 3/27/2017:  According to ADA guidelines, hemoglobin A1C <7.0% represents  optimal control in non-pregnant diabetic patients.  Different  metrics may apply to specific populations.   Standards of Medical Care in Diabetes - 2016.  For the purpose of screening for the presence of diabetes:  <5.7%     Consistent with the absence of diabetes  5.7-6.4%  Consistent with increasing risk for diabetes   (prediabetes)  >or=6.5%  Consistent with diabetes  Currently no consensus exists for use of hemoglobin A1C  for diagnosis of diabetes for children.        Pending Labs     Order Current Status    Specimen to Pathology - Surgery In process    Specimen to Pathology - Surgery In process      Allergies as of 3/30/2017     No Known Allergies      Advance Directives     An advance directive is a document which, in the event you are no longer able to make decisions for yourself, tells your healthcare team what kind of treatment you do or do not want to receive, or who you would like to make those decisions for you.  If you do not currently have an advance directive, [facility] encourages you to create one.  For more information call:  [phone]), [phone] ([phone]),  or log on to [email or web address].        Language Assistance Services     ATTENTION: Language assistance services are available, free of charge. Please call [phone].      ATENCIÓN: Si habla español, tiene a ybarra disposición servicios gratuitos de asistencia lingüística. Llame al [phone].     CHÚ Ý: N?u b?n nói Ti?ng Vi?t, có các d?ch v? h? tr? ngôn ng? mi?n phí dành cho b?n. G?i s? [phone].         Diabetes Discharge Instructions                                   [facility] Sign-Up     Activating your [facility] account is as easy as 1-2-3!     1) Visit [email or web address], select Sign Up Now, enter this activation code and your date of birth, then select Next.  [email or web address]  Expires: 5/9/2017  2:22 PM      2) Create a username and password to use when you visit [facility] in the future and select a security question in case you lose your password and select Next.    3) Enter your e-mail address and click Sign Up!    Additional Information  If you have questions, please e-mail [email or web address] or call [phone] to talk to our [facility] staff. Remember, [facility] is NOT to be used for urgent needs. For medical emergencies, dial 911.          [facility] complies with applicable Federal civil rights laws and does not discriminate on the basis of race, color, national origin, age, disability, or sex.

## 2017-03-27 PROBLEM — O42.919 PRETERM PREMATURE RUPTURE OF MEMBRANES, ANTEPARTUM: Status: ACTIVE | Noted: 2017-03-27

## 2017-03-27 PROBLEM — O24.410 DIET CONTROLLED GESTATIONAL DIABETES MELLITUS (GDM) IN SECOND TRIMESTER: Status: ACTIVE | Noted: 2017-03-27

## 2017-03-27 LAB
BACTERIA UR CULT: NORMAL
POCT GLUCOSE: 127 MG/DL (ref 70–110)
POCT GLUCOSE: 130 MG/DL (ref 70–110)
POCT GLUCOSE: 132 MG/DL (ref 70–110)
POCT GLUCOSE: 151 MG/DL (ref 70–110)

## 2017-03-27 PROCEDURE — 25000003 PHARM REV CODE 250: Performed by: OBSTETRICS & GYNECOLOGY

## 2017-03-27 PROCEDURE — 83036 HEMOGLOBIN GLYCOSYLATED A1C: CPT

## 2017-03-27 PROCEDURE — 59025 FETAL NON-STRESS TEST: CPT | Mod: 26,,, | Performed by: OBSTETRICS & GYNECOLOGY

## 2017-03-27 PROCEDURE — 25000003 PHARM REV CODE 250: Performed by: STUDENT IN AN ORGANIZED HEALTH CARE EDUCATION/TRAINING PROGRAM

## 2017-03-27 PROCEDURE — 36415 COLL VENOUS BLD VENIPUNCTURE: CPT

## 2017-03-27 PROCEDURE — 76811 OB US DETAILED SNGL FETUS: CPT | Mod: 26,,, | Performed by: OBSTETRICS & GYNECOLOGY

## 2017-03-27 PROCEDURE — 63600175 PHARM REV CODE 636 W HCPCS: Performed by: OBSTETRICS & GYNECOLOGY

## 2017-03-27 PROCEDURE — 99233 SBSQ HOSP IP/OBS HIGH 50: CPT | Mod: 25,,, | Performed by: OBSTETRICS & GYNECOLOGY

## 2017-03-27 PROCEDURE — 63600175 PHARM REV CODE 636 W HCPCS: Performed by: STUDENT IN AN ORGANIZED HEALTH CARE EDUCATION/TRAINING PROGRAM

## 2017-03-27 PROCEDURE — 11000001 HC ACUTE MED/SURG PRIVATE ROOM

## 2017-03-27 RX ORDER — INSULIN ASPART 100 [IU]/ML
0-5 INJECTION, SOLUTION INTRAVENOUS; SUBCUTANEOUS EVERY 6 HOURS PRN
Status: DISCONTINUED | OUTPATIENT
Start: 2017-03-27 | End: 2017-03-28

## 2017-03-27 RX ORDER — GLUCAGON 1 MG
1 KIT INJECTION
Status: DISCONTINUED | OUTPATIENT
Start: 2017-03-27 | End: 2017-03-28

## 2017-03-27 RX ADMIN — AMPICILLIN SODIUM 2 G: 2 INJECTION, POWDER, FOR SOLUTION INTRAMUSCULAR; INTRAVENOUS at 08:03

## 2017-03-27 RX ADMIN — INDOMETHACIN 25 MG: 25 CAPSULE ORAL at 09:03

## 2017-03-27 RX ADMIN — AMPICILLIN SODIUM 2 G: 2 INJECTION, POWDER, FOR SOLUTION INTRAMUSCULAR; INTRAVENOUS at 03:03

## 2017-03-27 RX ADMIN — BETAMETHASONE SODIUM PHOSPHATE AND BETAMETHASONE ACETATE 12 MG: 3; 3 INJECTION, SUSPENSION INTRA-ARTICULAR; INTRALESIONAL; INTRAMUSCULAR at 04:03

## 2017-03-27 RX ADMIN — SODIUM CHLORIDE, SODIUM LACTATE, POTASSIUM CHLORIDE, AND CALCIUM CHLORIDE: .6; .31; .03; .02 INJECTION, SOLUTION INTRAVENOUS at 06:03

## 2017-03-27 RX ADMIN — MAGNESIUM SULFATE IN WATER 2 G/HR: 40 INJECTION, SOLUTION INTRAVENOUS at 12:03

## 2017-03-27 RX ADMIN — Medication 1 EACH: at 08:03

## 2017-03-27 RX ADMIN — AMPICILLIN SODIUM 2 G: 2 INJECTION, POWDER, FOR SOLUTION INTRAMUSCULAR; INTRAVENOUS at 09:03

## 2017-03-27 RX ADMIN — INDOMETHACIN 25 MG: 25 CAPSULE ORAL at 04:03

## 2017-03-27 NOTE — PROGRESS NOTES
Magnesium Assessment Note    Subjective:         Jlil Kimbrough is a 41 y.o. female at 26w6d with GDM, threatened PTL, and PPROM day #2 on IV MgSO4 for fetal neuroprotection.  Patient reports continued mild leakage of fluid and new onset vaginal spotting.  She reports good FM and denies contractions.  Patient denies headaches, denies blurry vision and denies right upper quadrant pain. Denies CP, denies SOB. Patient reports no nausea/no vomiting.     Objective:      Temp:  [95.4 °F (35.2 °C)-109.4 °F (43 °C)] 97.7 °F (36.5 °C)  Pulse:  [] 90  Resp:  [18-20] 20  SpO2:  [89 %-99 %] 98 %  BP: ()/(50-89) 116/64    I/O last 3 completed shifts:  In: 3401.5 [I.V.:3001.5; IV Piggyback:400]  Out: 3955 [Urine:3955]  I/O this shift:  In: -   Out: 125 [Urine:125]    General:   alert, appears stated age and cooperative   Lungs:   clear to auscultation bilaterally   Heart::   regular rate and rhythm, S1, S2 normal, no murmur, click, rub or gallop   Extremities: peripheral pulses normal, no pedal edema, no clubbing or cyanosis   DTRs- 1+  bilaterally     NST: reassuring, Category 2, 145 bpm, moderate BTBV, (+) accelerations, occasional variable decelerations  TOCO: none      Lab Review  Recent Results (from the past 24 hour(s))   POCT glucose    Collection Time: 17 12:16 PM   Result Value Ref Range    POCT Glucose 134 (H) 70 - 110 mg/dL   POCT glucose    Collection Time: 17  6:22 PM   Result Value Ref Range    POCT Glucose 151 (H) 70 - 110 mg/dL   POCT glucose    Collection Time: 17 11:58 PM   Result Value Ref Range    POCT Glucose 130 (H) 70 - 110 mg/dL   POCT glucose    Collection Time: 17  6:02 AM   Result Value Ref Range    POCT Glucose 127 (H) 70 - 110 mg/dL        Assessment:     41 y.o.  female at 26w6d with GDM, threatened PTL, and PPROM day #2 on IV magnesium sulfate.    Active Hospital Problems    Diagnosis  POA    * premature rupture of membranes, antepartum [O42.919]  No     Diet controlled gestational diabetes mellitus (GDM) in second trimester [O24.410]  Yes    Threatened  labor [O47.00]  Yes    AMA (advanced maternal age) multigravida 35+ [O09.529]  Yes      Resolved Hospital Problems    Diagnosis Date Resolved POA   No resolved problems to display.        Plan:     - Continue magnesium sulfate, no signs of toxicity at this time  - Close maternal monitoring including UOP and BP   - UOP: 100-200 ml/hr last 4 hrs  - Recheck in 4 hours or PRN    Navya Bueno MD

## 2017-03-27 NOTE — PROGRESS NOTES
MAG NOTE     Jill Kimbrough is a 41 y.o. female at 26w5d now PPROM day #2 with PTL on IV MgSO4 for fetal neuroprotection.     Patient denies headaches, denies blurry vision and denies right upper quadrant pain. denies CP, denies SOB. Patient reports no nausea/no vomiting and no contractions.   Patient continues to report vaginal spotting.         Objective:       /63  Pulse 99  Temp 97.5 °F (36.4 °C) (Temporal)   Resp 18  Ht 5' (1.524 m)  Wt 85.7 kg (188 lb 15 oz)  LMP 09/20/2016  SpO2 98%  Breastfeeding? No  BMI 36.9 kg/m2  Vitals:    03/27/17 1540 03/27/17 1550 03/27/17 1600 03/27/17 1610   BP:   112/63    Pulse: 95 95 90 99   Resp:       Temp:       TempSrc:       SpO2: 98% 98% 98% 98%   Weight:       Height:             I/O last 3 completed shifts:  In: 3401.5 [I.V.:3001.5; IV Piggyback:400]  Out: 3955 [Urine:3955]    I/O this shift:  In: 900 [I.V.:800; IV Piggyback:100]  Out: 1170 [Urine:1170]      General:   alert, appears stated age and no distress   Lungs:   clear to auscultation bilaterally   Heart:   regular rate and rhythm and S1, S2 normal   Extremities: peripheral pulses normal, no pedal edema, no clubbing or cyanosis   Reflexes - 1+  bilaterally       NST: 145 bpm, moderate variability, reassuring   TOCO: none    Lab Review  Recent Results (from the past 48 hour(s))   Fetal fibronectin 26weeks 5days    Collection Time: 03/26/17  3:30 AM   Result Value Ref Range    Fetal Fibronectin Positive (A) Negative   Urine culture    Collection Time: 03/26/17  3:45 AM   Result Value Ref Range    Urine Culture, Routine No significant growth    CBC auto differential    Collection Time: 03/26/17  4:27 AM   Result Value Ref Range    WBC 14.05 (H) 3.90 - 12.70 K/uL    RBC 4.25 4.00 - 5.40 M/uL    Hemoglobin 11.5 (L) 12.0 - 16.0 g/dL    Hematocrit 34.2 (L) 37.0 - 48.5 %    MCV 81 (L) 82 - 98 fL    MCH 27.1 27.0 - 31.0 pg    MCHC 33.6 32.0 - 36.0 %    RDW 15.0 (H) 11.5 - 14.5 %    Platelets 212 150 - 350 K/uL     MPV 10.7 9.2 - 12.9 fL    Gran # 10.8 (H) 1.8 - 7.7 K/uL    Lymph # 1.8 1.0 - 4.8 K/uL    Mono # 1.2 (H) 0.3 - 1.0 K/uL    Eos # 0.1 0.0 - 0.5 K/uL    Baso # 0.02 0.00 - 0.20 K/uL    Gran% 77.0 (H) 38.0 - 73.0 %    Lymph% 12.8 (L) 18.0 - 48.0 %    Mono% 8.3 4.0 - 15.0 %    Eosinophil% 0.7 0.0 - 8.0 %    Basophil% 0.1 0.0 - 1.9 %    Differential Method Automated    Comprehensive metabolic panel    Collection Time: 17  4:27 AM   Result Value Ref Range    Sodium 134 (L) 136 - 145 mmol/L    Potassium 3.3 (L) 3.5 - 5.1 mmol/L    Chloride 105 95 - 110 mmol/L    CO2 19 (L) 23 - 29 mmol/L    Glucose 118 (H) 70 - 110 mg/dL    BUN, Bld 4 (L) 6 - 20 mg/dL    Creatinine 0.6 0.5 - 1.4 mg/dL    Calcium 9.1 8.7 - 10.5 mg/dL    Total Protein 6.4 6.0 - 8.4 g/dL    Albumin 2.6 (L) 3.5 - 5.2 g/dL    Total Bilirubin 0.4 0.1 - 1.0 mg/dL    Alkaline Phosphatase 79 55 - 135 U/L    AST 12 10 - 40 U/L    ALT 12 10 - 44 U/L    Anion Gap 10 8 - 16 mmol/L    eGFR if African American >60 >60 mL/min/1.73 m^2    eGFR if non African American >60 >60 mL/min/1.73 m^2   Type & Screen, Labor & Delivery    Collection Time: 17  4:33 AM   Result Value Ref Range    Group & Rh A POS     Indirect [place] NEG    POCT glucose    Collection Time: 17 12:16 PM   Result Value Ref Range    POCT Glucose 134 (H) 70 - 110 mg/dL   POCT glucose    Collection Time: 17  6:22 PM   Result Value Ref Range    POCT Glucose 151 (H) 70 - 110 mg/dL   POCT glucose    Collection Time: 17 11:58 PM   Result Value Ref Range    POCT Glucose 130 (H) 70 - 110 mg/dL   POCT glucose    Collection Time: 17  6:02 AM   Result Value Ref Range    POCT Glucose 127 (H) 70 - 110 mg/dL   POCT glucose    Collection Time: 17 11:54 AM   Result Value Ref Range    POCT Glucose 151 (H) 70 - 110 mg/dL          Assessment:     41 y.o.  female at 26w6d with GDM, threatened PTL, and PPROM day #2 on IV magnesium sulfate.     Active Hospital Problems     Diagnosis  POA    * premature rupture of membranes, antepartum [O42.919]  No    Diet controlled gestational diabetes mellitus (GDM) in second trimester [O24.410]  Yes    Threatened  labor [O47.00]  Yes    AMA (advanced maternal age) multigravida 35+ [O09.529]  Yes      Resolved Hospital Problems    Diagnosis Date Resolved POA   No resolved problems to display.        Plan:   - Continue magnesium sulfate, no signs of toxicity at this time  - Close maternal monitoring including UOP and BP   - UOP: 200 ml/hr last 4 hours  - Recheck in 4 hours or PRN

## 2017-03-27 NOTE — PROGRESS NOTES
Progress Note/Magnesium Assessment Note    Subjective:         Jill Kimbrough is a 41 y.o. female at 26w6d with GDM, threatened PTL, now PPROM day #2 on IV MgSO4 for fetal neuroprotection.    Patient doing well this morning. Denies contractions, VB, changes in fluid leakage. Good FM. Denies HA, vision changes, RUQ pain, CP, or SOB. Patient reports no nausea/no vomiting.     Objective:      Temp:  [95.4 °F (35.2 °C)-109.4 °F (43 °C)] 97.2 °F (36.2 °C)  Pulse:  [] 92  Resp:  [16-20] 18  SpO2:  [89 %-99 %] 98 %  BP: ()/(50-89) 123/65      Intake/Output Summary (Last 24 hours) at 17 0452  Last data filed at 17 0400   Gross per 24 hour   Intake           3201.5 ml   Output             3585 ml   Net           -383.5 ml       General:   alert, appears stated age and cooperative   Lungs:   clear to auscultation bilaterally   Heart:  Abdomen:   regular rate and rhythm   soft, non-tender, gravid   Extremities: peripheral pulses normal, no pedal edema, no clubbing or cyanosis   DTRs- 2+  bilaterally     NST: Category 2, 140 bpm, moderate BTBV, (+) accelerations, occasional variable decelerations, overall reassuring and appropriate for GA  TOCO: none detected    SVE: deferred    Lab Review  Blood Sugars (AccuCheck):  Recent Labs      17   1216  17   1822  17   2358   POCTGLUCOSE  134*  151*  130*         Assessment:         Active Hospital Problems    Diagnosis  POA    * premature rupture of membranes, antepartum [O42.919]  Unknown    Threatened  labor [O47.00]  Yes    Diet controlled gestational diabetes mellitus (GDM) in second trimester [O24.410]  Unknown    AMA (advanced maternal age) multigravida 35+ [O09.529]  Yes      Resolved Hospital Problems    Diagnosis Date Resolved POA   No resolved problems to display.     41 y.o.  female at 26w6d with GDM, here for threatened PTL, now with PPROM, on IV magnesium sulfate.     Plan:     PPROM D#2 with threatened PTL  -  Continue magnesium sulfate for fetal neuroprotection, no signs of toxicity at this time  - Close maternal monitoring, UOP adequate  - continue PPROM latency antibiotics, D#2/7  - s/p BMZ x2  - on indocin q6 through steroid window per Dr. Fields  - continuous monitoring, NPO for now  - monitor for s/s of labor or infection  - urine culture pending, needs GC/CT, affirm, and GBS today  - Recheck in 4 hours or PRN    GDM  - diet controlled prior to admission  - accuchecks q6 while NPO, ranging 130-150s    Gilda Aparicio MD  OBGYN - PGY 2

## 2017-03-27 NOTE — NURSING
Pt awake and orientated this shift.  present for Prydeinig translation. VS stable, Nil complaints of pain or contraction, scant PV blood loss seen on pad. Blood Glucose level 132 @ 1750.

## 2017-03-27 NOTE — PROGRESS NOTES
Magnesium Assessment Note    Subjective:         Jill Kimbrough is a 41 y.o. female at 26w6d now PPROM day #2 on IV MgSO4 for fetal neuroprotection.    Patient reports last contraction ~1 hr ago. Reports blurry vision resolved. Denies HA, scotoma, RUQ pain, CP, or SOB. Patient reports no nausea/no vomiting.     Objective:      Temp:  [98 °F (36.7 °C)-99.1 °F (37.3 °C)] 98.5 °F (36.9 °C)  Pulse:  [] 107  Resp:  [16-20] 20  SpO2:  [89 %-100 %] 98 %  BP: ()/(50-85) 104/52    I/O last 3 completed shifts:  In: 17.5 [I.V.:17.5]  Out: -   I/O this shift:  In: 844.2 [I.V.:644.2; IV Piggyback:200]  Out: -     General:   alert, appears stated age and cooperative   Lungs:   clear to auscultation bilaterally   Heart::   regular rate and rhythm   Extremities: peripheral pulses normal, no pedal edema, no clubbing or cyanosis   DTRs- 2+  bilaterally     NST: Category 2, 135 bpm, moderate BTBV, (+) accelerations, occasional small variable decelerations, overall reassuring and appropriate for GA  TOCO: none detected    Lab Review  Recent Results (from the past 24 hour(s))   Fetal fibronectin 26weeks 5days    Collection Time: 03/26/17  3:30 AM   Result Value Ref Range    Fetal Fibronectin Positive (A) Negative   CBC auto differential    Collection Time: 03/26/17  4:27 AM   Result Value Ref Range    WBC 14.05 (H) 3.90 - 12.70 K/uL    RBC 4.25 4.00 - 5.40 M/uL    Hemoglobin 11.5 (L) 12.0 - 16.0 g/dL    Hematocrit 34.2 (L) 37.0 - 48.5 %    MCV 81 (L) 82 - 98 fL    MCH 27.1 27.0 - 31.0 pg    MCHC 33.6 32.0 - 36.0 %    RDW 15.0 (H) 11.5 - 14.5 %    Platelets 212 150 - 350 K/uL    MPV 10.7 9.2 - 12.9 fL    Gran # 10.8 (H) 1.8 - 7.7 K/uL    Lymph # 1.8 1.0 - 4.8 K/uL    Mono # 1.2 (H) 0.3 - 1.0 K/uL    Eos # 0.1 0.0 - 0.5 K/uL    Baso # 0.02 0.00 - 0.20 K/uL    Gran% 77.0 (H) 38.0 - 73.0 %    Lymph% 12.8 (L) 18.0 - 48.0 %    Mono% 8.3 4.0 - 15.0 %    Eosinophil% 0.7 0.0 - 8.0 %    Basophil% 0.1 0.0 - 1.9 %    Differential Method  Automated    Comprehensive metabolic panel    Collection Time: 17  4:27 AM   Result Value Ref Range    Sodium 134 (L) 136 - 145 mmol/L    Potassium 3.3 (L) 3.5 - 5.1 mmol/L    Chloride 105 95 - 110 mmol/L    CO2 19 (L) 23 - 29 mmol/L    Glucose 118 (H) 70 - 110 mg/dL    BUN, Bld 4 (L) 6 - 20 mg/dL    Creatinine 0.6 0.5 - 1.4 mg/dL    Calcium 9.1 8.7 - 10.5 mg/dL    Total Protein 6.4 6.0 - 8.4 g/dL    Albumin 2.6 (L) 3.5 - 5.2 g/dL    Total Bilirubin 0.4 0.1 - 1.0 mg/dL    Alkaline Phosphatase 79 55 - 135 U/L    AST 12 10 - 40 U/L    ALT 12 10 - 44 U/L    Anion Gap 10 8 - 16 mmol/L    eGFR if African American >60 >60 mL/min/1.73 m^2    eGFR if non African American >60 >60 mL/min/1.73 m^2   Type & Screen, Labor & Delivery    Collection Time: 17  4:33 AM   Result Value Ref Range    Group & Rh A POS     Indirect Bentley NEG    POCT glucose    Collection Time: 17 12:16 PM   Result Value Ref Range    POCT Glucose 134 (H) 70 - 110 mg/dL        Assessment:         Active Hospital Problems    Diagnosis  POA    * premature rupture of membranes, antepartum [O42.919]  Unknown    Threatened  labor [O47.00]  Yes    Diet controlled gestational diabetes mellitus (GDM) in second trimester [O24.410]  Unknown    AMA (advanced maternal age) multigravida 35+ [O09.529]  Yes      Resolved Hospital Problems    Diagnosis Date Resolved POA   No resolved problems to display.     41 y.o.  female at 26w6d here for threatened PTL, now with PPROM, on IV magnesium sulfate.     Plan:     - Continue magnesium sulfate, no signs of toxicity at this time  - Close maternal monitoring  - continue PPROM abx  - s/p 1/2 BMZ at OSH, second dose due 3/27 at 0415  - UOP adequate  - Recheck in 4 hours or PRN    Gilda Aparicio MD  OBGYN - PGY 2

## 2017-03-27 NOTE — MEDICAL/APP STUDENT
MAG NOTE     Jill Kimbrough is a 41 y.o. female at 26w5d now PPROM day #2 with PTL on IV MgSO4 for fetal neuroprotection.     Patient denies headaches, denies blurry vision and denies right upper quadrant pain. denies CP, denies SOB. Patient reports no nausea/no vomiting.     Objective:       BP (!) 108/58  Pulse 92  Temp 98.4 °F (36.9 °C) (Tympanic)   Resp 20  Ht 5' (1.524 m)  Wt 85.7 kg (188 lb 15 oz)  LMP 09/20/2016  SpO2 97%  Breastfeeding? No  BMI 36.9 kg/m2  Vitals:    03/27/17 1150 03/27/17 1158 03/27/17 1202 03/27/17 1203   BP:   (!) 108/58    Pulse: 96 93 94 92   Resp:   20    Temp:   98.4 °F (36.9 °C)    TempSrc:   Tympanic    SpO2: 98% 98%  97%   Weight:       Height:           I/O last 3 completed shifts:  In: 3401.5 [I.V.:3001.5; IV Piggyback:400]  Out: 3955 [Urine:3955]    I/O this shift:  In: -   Out: 460 [Urine:460]      Date 03/27/17 0700 - 03/28/17 0659   Shift 7658-0252 1461-5660 4610-5345 24 Hour Total   I  N  T  A  K  E   Shift Total  (mL/kg)       O  U  T  P  U  T   Urine  (mL/kg/hr) 560   560    Shift Total  (mL/kg) 560  (6.5)   560  (6.5)   Weight (kg) 85.7 85.7 85.7 85.7         General:   alert, appears stated age, cooperative and fatigued   Lungs:   clear to auscultation bilaterally   Heart:   regular rate and rhythm and S1, S2 normal       Extremities: peripheral pulses normal, no pedal edema, no clubbing or cyanosis   Reflexes - 1+  bilaterally     FHTs: 140 bpm, Cat 2, reassuring    Lab Review  Recent Results (from the past 48 hour(s))   Urinalysis    Collection Time: 03/25/17  2:16 PM   Result Value Ref Range    Specimen UA Urine, Clean Catch     Color, UA Yellow Yellow, Straw, Liyah    Appearance, UA Hazy (A) Clear    pH, UA 6.0 5.0 - 8.0    Specific Gravity, UA 1.010 1.005 - 1.030    Protein, UA Negative Negative    Glucose, UA Negative Negative    Ketones, UA Trace (A) Negative    Bilirubin (UA) Negative Negative    Occult Blood UA Trace (A) Negative    Nitrite, UA Negative  Negative    Urobilinogen, UA 1.0 <2.0 EU/dL    Leukocytes, UA Trace (A) Negative   Urine culture    Collection Time: 03/25/17  2:16 PM   Result Value Ref Range    Urine Culture, Routine No significant growth    Urinalysis Microscopic    Collection Time: 03/25/17  2:16 PM   Result Value Ref Range    RBC, UA 0 0 - 4 /hpf    WBC, UA 2 0 - 5 /hpf    Microscopic Comment SEE COMMENT    Fetal fibronectin 26weeks 5days    Collection Time: 03/26/17  3:30 AM   Result Value Ref Range    Fetal Fibronectin Positive (A) Negative   Urine culture    Collection Time: 03/26/17  3:45 AM   Result Value Ref Range    Urine Culture, Routine No significant growth    CBC auto differential    Collection Time: 03/26/17  4:27 AM   Result Value Ref Range    WBC 14.05 (H) 3.90 - 12.70 K/uL    RBC 4.25 4.00 - 5.40 M/uL    Hemoglobin 11.5 (L) 12.0 - 16.0 g/dL    Hematocrit 34.2 (L) 37.0 - 48.5 %    MCV 81 (L) 82 - 98 fL    MCH 27.1 27.0 - 31.0 pg    MCHC 33.6 32.0 - 36.0 %    RDW 15.0 (H) 11.5 - 14.5 %    Platelets 212 150 - 350 K/uL    MPV 10.7 9.2 - 12.9 fL    Gran # 10.8 (H) 1.8 - 7.7 K/uL    Lymph # 1.8 1.0 - 4.8 K/uL    Mono # 1.2 (H) 0.3 - 1.0 K/uL    Eos # 0.1 0.0 - 0.5 K/uL    Baso # 0.02 0.00 - 0.20 K/uL    Gran% 77.0 (H) 38.0 - 73.0 %    Lymph% 12.8 (L) 18.0 - 48.0 %    Mono% 8.3 4.0 - 15.0 %    Eosinophil% 0.7 0.0 - 8.0 %    Basophil% 0.1 0.0 - 1.9 %    Differential Method Automated    Comprehensive metabolic panel    Collection Time: 03/26/17  4:27 AM   Result Value Ref Range    Sodium 134 (L) 136 - 145 mmol/L    Potassium 3.3 (L) 3.5 - 5.1 mmol/L    Chloride 105 95 - 110 mmol/L    CO2 19 (L) 23 - 29 mmol/L    Glucose 118 (H) 70 - 110 mg/dL    BUN, Bld 4 (L) 6 - 20 mg/dL    Creatinine 0.6 0.5 - 1.4 mg/dL    Calcium 9.1 8.7 - 10.5 mg/dL    Total Protein 6.4 6.0 - 8.4 g/dL    Albumin 2.6 (L) 3.5 - 5.2 g/dL    Total Bilirubin 0.4 0.1 - 1.0 mg/dL    Alkaline Phosphatase 79 55 - 135 U/L    AST 12 10 - 40 U/L    ALT 12 10 - 44 U/L    Anion  Gap 10 8 - 16 mmol/L    eGFR if African American >60 >60 mL/min/1.73 m^2    eGFR if non African American >60 >60 mL/min/1.73 m^2   Type & Screen, Labor & Delivery    Collection Time: 17  4:33 AM   Result Value Ref Range    Group & Rh A POS     Indirect Bentley NEG    POCT glucose    Collection Time: 17 12:16 PM   Result Value Ref Range    POCT Glucose 134 (H) 70 - 110 mg/dL   POCT glucose    Collection Time: 17  6:22 PM   Result Value Ref Range    POCT Glucose 151 (H) 70 - 110 mg/dL   POCT glucose    Collection Time: 17 11:58 PM   Result Value Ref Range    POCT Glucose 130 (H) 70 - 110 mg/dL   POCT glucose    Collection Time: 17  6:02 AM   Result Value Ref Range    POCT Glucose 127 (H) 70 - 110 mg/dL          Assessment:     41 y.o.  female at 26w5d with GDM, threatened PTL and PPROM, on day#2 IV magnesium sulfate.    Active Hospital Problems    Diagnosis  POA    * premature rupture of membranes, antepartum [O42.919]  No    Diet controlled gestational diabetes mellitus (GDM) in second trimester [O24.410]  Yes    Threatened  labor [O47.00]  Yes    AMA (advanced maternal age) multigravida 35+ [O09.529]  Yes      Resolved Hospital Problems    Diagnosis Date Resolved POA   No resolved problems to display.        Plan:   - Continue magnesium sulfate, no signs of toxicity at this time  - Close maternal monitoring including UOP.     - 235 ml/kg 4hours  - Recheck in 4 hours or PRN

## 2017-03-27 NOTE — MEDICAL/APP STUDENT
MAG NOTE     Jill Kimbrough is a 41 y.o. female at 26w5d now PPROM day #2 with PTL on IV MgSO4 for fetal neuroprotection.     Patient denies headaches, denies blurry vision and denies right upper quadrant pain. denies CP, denies SOB. Patient reports no nausea/no vomiting and no contractions.     Patient endorses vaginal bleeding.    Objective:       /63  Pulse 96  Temp 97.5 °F (36.4 °C) (Temporal)   Resp 18  Ht 5' (1.524 m)  Wt 85.7 kg (188 lb 15 oz)  LMP 09/20/2016  SpO2 99%  Breastfeeding? No  BMI 36.9 kg/m2  Vitals:    03/27/17 1500 03/27/17 1510 03/27/17 1520 03/27/17 1530   BP: 120/63      Pulse: 87 87 92 96   Resp: 18      Temp: 97.5 °F (36.4 °C)      TempSrc: Temporal      SpO2: 99% 97% 98% 99%   Weight:       Height:             I/O last 3 completed shifts:  In: 3401.5 [I.V.:3001.5; IV Piggyback:400]  Out: 3955 [Urine:3955]    I/O this shift:  In: 900 [I.V.:800; IV Piggyback:100]  Out: 1170 [Urine:1170]      General:   alert, appears stated age and no distress   Lungs:   clear to auscultation bilaterally   Heart:   regular rate and rhythm and S1, S2 normal   Extremities: peripheral pulses normal, no pedal edema, no clubbing or cyanosis   Reflexes - 1+  bilaterally       NST: 145 bpm, moderate variability, reassuring  TOCO: none    Lab Review  Recent Results (from the past 48 hour(s))   Fetal fibronectin 26weeks 5days    Collection Time: 03/26/17  3:30 AM   Result Value Ref Range    Fetal Fibronectin Positive (A) Negative   Urine culture    Collection Time: 03/26/17  3:45 AM   Result Value Ref Range    Urine Culture, Routine No significant growth    CBC auto differential    Collection Time: 03/26/17  4:27 AM   Result Value Ref Range    WBC 14.05 (H) 3.90 - 12.70 K/uL    RBC 4.25 4.00 - 5.40 M/uL    Hemoglobin 11.5 (L) 12.0 - 16.0 g/dL    Hematocrit 34.2 (L) 37.0 - 48.5 %    MCV 81 (L) 82 - 98 fL    MCH 27.1 27.0 - 31.0 pg    MCHC 33.6 32.0 - 36.0 %    RDW 15.0 (H) 11.5 - 14.5 %    Platelets 212 150  - 350 K/uL    MPV 10.7 9.2 - 12.9 fL    Gran # 10.8 (H) 1.8 - 7.7 K/uL    Lymph # 1.8 1.0 - 4.8 K/uL    Mono # 1.2 (H) 0.3 - 1.0 K/uL    Eos # 0.1 0.0 - 0.5 K/uL    Baso # 0.02 0.00 - 0.20 K/uL    Gran% 77.0 (H) 38.0 - 73.0 %    Lymph% 12.8 (L) 18.0 - 48.0 %    Mono% 8.3 4.0 - 15.0 %    Eosinophil% 0.7 0.0 - 8.0 %    Basophil% 0.1 0.0 - 1.9 %    Differential Method Automated    Comprehensive metabolic panel    Collection Time: 17  4:27 AM   Result Value Ref Range    Sodium 134 (L) 136 - 145 mmol/L    Potassium 3.3 (L) 3.5 - 5.1 mmol/L    Chloride 105 95 - 110 mmol/L    CO2 19 (L) 23 - 29 mmol/L    Glucose 118 (H) 70 - 110 mg/dL    BUN, Bld 4 (L) 6 - 20 mg/dL    Creatinine 0.6 0.5 - 1.4 mg/dL    Calcium 9.1 8.7 - 10.5 mg/dL    Total Protein 6.4 6.0 - 8.4 g/dL    Albumin 2.6 (L) 3.5 - 5.2 g/dL    Total Bilirubin 0.4 0.1 - 1.0 mg/dL    Alkaline Phosphatase 79 55 - 135 U/L    AST 12 10 - 40 U/L    ALT 12 10 - 44 U/L    Anion Gap 10 8 - 16 mmol/L    eGFR if African American >60 >60 mL/min/1.73 m^2    eGFR if non African American >60 >60 mL/min/1.73 m^2   Type & Screen, Labor & Delivery    Collection Time: 17  4:33 AM   Result Value Ref Range    Group & Rh A POS     Indirect Bentley NEG    POCT glucose    Collection Time: 17 12:16 PM   Result Value Ref Range    POCT Glucose 134 (H) 70 - 110 mg/dL   POCT glucose    Collection Time: 17  6:22 PM   Result Value Ref Range    POCT Glucose 151 (H) 70 - 110 mg/dL   POCT glucose    Collection Time: 17 11:58 PM   Result Value Ref Range    POCT Glucose 130 (H) 70 - 110 mg/dL   POCT glucose    Collection Time: 17  6:02 AM   Result Value Ref Range    POCT Glucose 127 (H) 70 - 110 mg/dL   POCT glucose    Collection Time: 17 11:54 AM   Result Value Ref Range    POCT Glucose 151 (H) 70 - 110 mg/dL          Assessment:     41 y.o.  female at 26w6d with GDM, threatened PTL, and PPROM day #2 on IV magnesium sulfate.     Active Hospital  Problems    Diagnosis  POA    * premature rupture of membranes, antepartum [O42.919]  No    Diet controlled gestational diabetes mellitus (GDM) in second trimester [O24.410]  Yes    Threatened  labor [O47.00]  Yes    AMA (advanced maternal age) multigravida 35+ [O09.529]  Yes      Resolved Hospital Problems    Diagnosis Date Resolved POA   No resolved problems to display.        Plan:   - Continue magnesium sulfate, no signs of toxicity at this time  - Close maternal monitoring including UOP and BP   - UOP: 200 ml/hr last 4 hours  - Recheck in 4 hours or PRN

## 2017-03-27 NOTE — MEDICAL/APP STUDENT
Magnesium Assessment Note    Subjective:         Jill Kimbrough is a 41 y.o. female at 26w6d on IV MgSO4 for seizure prophylaxis.    Patient denies headaches, denies blurry vision and denies right upper quadrant pain. Denies CP, denies SOB. Patient reports no nausea/no vomiting.     Objective:      Temp:  [95.4 °F (35.2 °C)-109.4 °F (43 °C)] 97.2 °F (36.2 °C)  Pulse:  [] 92  Resp:  [16-20] 18  SpO2:  [89 %-99 %] 98 %  BP: ()/(50-89) 123/65    I/O last 3 completed shifts:  In: 2050.8 [I.V.:1850.8; IV Piggyback:200]  Out: 2100 [Urine:2100]  I/O this shift:  In: 1150.7 [I.V.:950.7; IV Piggyback:200]  Out: 1485 [Urine:1485]   UOP: 200ml/hr    General:   alert, appears stated age and cooperative   Lungs:   clear to auscultation bilaterally   Heart::   regular rate and rhythm   Extremities: peripheral pulses normal, no pedal edema, no clubbing or cyanosis   DTRs- 2+  bilaterally     NST: reassuring, Category {NUMBERS; 0-3:}, 140 bpm, moderate BTBV, (+) accelerations, short variable decelerations  TOCO: no contractions      Lab Review  BP: ()/(50-89) 123/65       Assessment:     41 y.o.  female at 26w6d, on IV magnesium sulfate.    Active Hospital Problems    Diagnosis  POA    * premature rupture of membranes, antepartum [O42.919]  Unknown    Threatened  labor [O47.00]  Yes    Diet controlled gestational diabetes mellitus (GDM) in second trimester [O24.410]  Unknown    AMA (advanced maternal age) multigravida 35+ [O09.529]  Yes      Resolved Hospital Problems    Diagnosis Date Resolved POA   No resolved problems to display.        Plan:     - Continue magnesium sulfate, no signs of toxicity at this time  - Close maternal monitoring including UOP and BP  - Recheck in 2-4 hours or PRN  - ***    Hong Simpson

## 2017-03-27 NOTE — PROGRESS NOTES
MAG NOTE     Jill Kimbrough is a 41 y.o. female at 26w5d now PPROM day #2 with PTL on IV MgSO4 for fetal neuroprotection.     Patient denies headaches, denies blurry vision and denies right upper quadrant pain. denies CP, denies SOB. Patient reports no nausea/no vomiting.     Objective:       /62  Pulse 94  Temp 98.4 °F (36.9 °C) (Tympanic)   Resp 18  Ht 5' (1.524 m)  Wt 85.7 kg (188 lb 15 oz)  LMP 09/20/2016  SpO2 98%  Breastfeeding? No  BMI 36.9 kg/m2  Vitals:    03/27/17 1250 03/27/17 1300 03/27/17 1310 03/27/17 1320   BP:  107/62     Pulse: 92 88 92 94   Resp:       Temp:       TempSrc:       SpO2: 98% 98% 95% 98%   Weight:       Height:           I/O last 3 completed shifts:  In: 3401.5 [I.V.:3001.5; IV Piggyback:400]  Out: 3955 [Urine:3955]    I/O this shift:  In: -   Out: 795 [Urine:795]      Date 03/27/17 0700 - 03/28/17 0659   Shift 7073-9236 1368-2502 1897-9616 24 Hour Total   I  N  T  A  K  E   Shift Total  (mL/kg)       O  U  T  P  U  T   Urine  (mL/kg/hr) 895   895    Shift Total  (mL/kg) 895  (10.4)   895  (10.4)   Weight (kg) 85.7 85.7 85.7 85.7         General:   alert, appears stated age, cooperative and fatigued   Lungs:   clear to auscultation bilaterally   Heart:   regular rate and rhythm and S1, S2 normal       Extremities: peripheral pulses normal, no pedal edema, no clubbing or cyanosis   Reflexes - 1+  bilaterally     FHTs: 135 bpm, moderate variability, + accel, - decel, reassuring  Imbler: acontractile    Lab Review  Recent Results (from the past 48 hour(s))   Urinalysis    Collection Time: 03/25/17  2:16 PM   Result Value Ref Range    Specimen UA Urine, Clean Catch     Color, UA Yellow Yellow, Straw, Liyah    Appearance, UA Hazy (A) Clear    pH, UA 6.0 5.0 - 8.0    Specific Gravity, UA 1.010 1.005 - 1.030    Protein, UA Negative Negative    Glucose, UA Negative Negative    Ketones, UA Trace (A) Negative    Bilirubin (UA) Negative Negative    Occult Blood UA Trace (A) Negative     Nitrite, UA Negative Negative    Urobilinogen, UA 1.0 <2.0 EU/dL    Leukocytes, UA Trace (A) Negative   Urine culture    Collection Time: 03/25/17  2:16 PM   Result Value Ref Range    Urine Culture, Routine No significant growth    Urinalysis Microscopic    Collection Time: 03/25/17  2:16 PM   Result Value Ref Range    RBC, UA 0 0 - 4 /hpf    WBC, UA 2 0 - 5 /hpf    Microscopic Comment SEE COMMENT    Fetal fibronectin 26weeks 5days    Collection Time: 03/26/17  3:30 AM   Result Value Ref Range    Fetal Fibronectin Positive (A) Negative   Urine culture    Collection Time: 03/26/17  3:45 AM   Result Value Ref Range    Urine Culture, Routine No significant growth    CBC auto differential    Collection Time: 03/26/17  4:27 AM   Result Value Ref Range    WBC 14.05 (H) 3.90 - 12.70 K/uL    RBC 4.25 4.00 - 5.40 M/uL    Hemoglobin 11.5 (L) 12.0 - 16.0 g/dL    Hematocrit 34.2 (L) 37.0 - 48.5 %    MCV 81 (L) 82 - 98 fL    MCH 27.1 27.0 - 31.0 pg    MCHC 33.6 32.0 - 36.0 %    RDW 15.0 (H) 11.5 - 14.5 %    Platelets 212 150 - 350 K/uL    MPV 10.7 9.2 - 12.9 fL    Gran # 10.8 (H) 1.8 - 7.7 K/uL    Lymph # 1.8 1.0 - 4.8 K/uL    Mono # 1.2 (H) 0.3 - 1.0 K/uL    Eos # 0.1 0.0 - 0.5 K/uL    Baso # 0.02 0.00 - 0.20 K/uL    Gran% 77.0 (H) 38.0 - 73.0 %    Lymph% 12.8 (L) 18.0 - 48.0 %    Mono% 8.3 4.0 - 15.0 %    Eosinophil% 0.7 0.0 - 8.0 %    Basophil% 0.1 0.0 - 1.9 %    Differential Method Automated    Comprehensive metabolic panel    Collection Time: 03/26/17  4:27 AM   Result Value Ref Range    Sodium 134 (L) 136 - 145 mmol/L    Potassium 3.3 (L) 3.5 - 5.1 mmol/L    Chloride 105 95 - 110 mmol/L    CO2 19 (L) 23 - 29 mmol/L    Glucose 118 (H) 70 - 110 mg/dL    BUN, Bld 4 (L) 6 - 20 mg/dL    Creatinine 0.6 0.5 - 1.4 mg/dL    Calcium 9.1 8.7 - 10.5 mg/dL    Total Protein 6.4 6.0 - 8.4 g/dL    Albumin 2.6 (L) 3.5 - 5.2 g/dL    Total Bilirubin 0.4 0.1 - 1.0 mg/dL    Alkaline Phosphatase 79 55 - 135 U/L    AST 12 10 - 40 U/L    ALT 12  10 - 44 U/L    Anion Gap 10 8 - 16 mmol/L    eGFR if African American >60 >60 mL/min/1.73 m^2    eGFR if non African American >60 >60 mL/min/1.73 m^2   Type & Screen, Labor & Delivery    Collection Time: 17  4:33 AM   Result Value Ref Range    Group & Rh A POS     Indirect Bentley NEG    POCT glucose    Collection Time: 17 12:16 PM   Result Value Ref Range    POCT Glucose 134 (H) 70 - 110 mg/dL   POCT glucose    Collection Time: 17  6:22 PM   Result Value Ref Range    POCT Glucose 151 (H) 70 - 110 mg/dL   POCT glucose    Collection Time: 17 11:58 PM   Result Value Ref Range    POCT Glucose 130 (H) 70 - 110 mg/dL   POCT glucose    Collection Time: 17  6:02 AM   Result Value Ref Range    POCT Glucose 127 (H) 70 - 110 mg/dL          Assessment:     41 y.o.  female at 26w5d with GDM, threatened PTL and PPROM, on day#2 IV magnesium sulfate.    Active Hospital Problems    Diagnosis  POA    * premature rupture of membranes, antepartum [O42.919]  No    Diet controlled gestational diabetes mellitus (GDM) in second trimester [O24.410]  Yes    Threatened  labor [O47.00]  Yes    AMA (advanced maternal age) multigravida 35+ [O09.529]  Yes      Resolved Hospital Problems    Diagnosis Date Resolved POA   No resolved problems to display.        Plan:   - Continue magnesium sulfate, no signs of toxicity at this time  - Close maternal monitoring including UOP.     -  cc/hr  - Recheck in 4 hours or PRN

## 2017-03-27 NOTE — MEDICAL/APP STUDENT
Magnesium Assessment Note    Subjective:         Jill Kimbrough is a 41 y.o. female at 26w6d with GDM, threatened PTL, and PPROM day #2 on IV MgSO4 for fetal neuroprotection.    Patient denies headaches, denies blurry vision and denies right upper quadrant pain. Denies CP, denies SOB. Patient reports no nausea/no vomiting.     Objective:      Temp:  [95.4 °F (35.2 °C)-109.4 °F (43 °C)] 97.7 °F (36.5 °C)  Pulse:  [] 90  Resp:  [18-20] 20  SpO2:  [89 %-99 %] 98 %  BP: ()/(50-89) 116/64    I/O last 3 completed shifts:  In: 3401.5 [I.V.:3001.5; IV Piggyback:400]  Out: 3955 [Urine:3955]  I/O this shift:  In: -   Out: 125 [Urine:125]    General:   alert, appears stated age and cooperative   Lungs:   clear to auscultation bilaterally   Heart::   regular rate and rhythm, S1, S2 normal, no murmur, click, rub or gallop   Extremities: peripheral pulses normal, no pedal edema, no clubbing or cyanosis   DTRs- 1+  bilaterally     NST: reassuring, Category 2, *** bpm, moderate BTBV, (+) accelerations, occasional variable decelerations  TOCO: none      Lab Review  Recent Results (from the past 24 hour(s))   POCT glucose    Collection Time: 17 12:16 PM   Result Value Ref Range    POCT Glucose 134 (H) 70 - 110 mg/dL   POCT glucose    Collection Time: 17  6:22 PM   Result Value Ref Range    POCT Glucose 151 (H) 70 - 110 mg/dL   POCT glucose    Collection Time: 17 11:58 PM   Result Value Ref Range    POCT Glucose 130 (H) 70 - 110 mg/dL   POCT glucose    Collection Time: 17  6:02 AM   Result Value Ref Range    POCT Glucose 127 (H) 70 - 110 mg/dL        Assessment:     41 y.o.  female at 26w6d with GDM, threatened PTL, and PPROM day #2 on IV magnesium sulfate.    Active Hospital Problems    Diagnosis  POA    * premature rupture of membranes, antepartum [O42.919]  No    Diet controlled gestational diabetes mellitus (GDM) in second trimester [O24.410]  Yes    Threatened  labor  [O47.00]  Yes    AMA (advanced maternal age) multigravida 35+ [O09.529]  Yes      Resolved Hospital Problems    Diagnosis Date Resolved POA   No resolved problems to display.        Plan:     - Continue magnesium sulfate, no signs of toxicity at this time  - Close maternal monitoring including UOP and BP   - UOP: 100-200 ml/hr last 4 hrs  - Recheck in 4 hours or PRN    Ashwin Menon

## 2017-03-27 NOTE — PROGRESS NOTES
Magnesium Assessment Note    Subjective:         Jill Kimbrough is a 41 y.o. female at 26w5d now PPROM day #1 on IV MgSO4 for fetal neuroprotection.    Patient reports resolution of abdominal pain/contractions. Reports mild burry vision which is new. Denies HA, scotoma, RUQ pain, CP, or SOB. Patient reports no nausea/no vomiting.     Objective:      Temp:  [98 °F (36.7 °C)-99.1 °F (37.3 °C)] 98.5 °F (36.9 °C)  Pulse:  [] 107  Resp:  [16-20] 20  SpO2:  [89 %-100 %] 98 %  BP: ()/(50-85) 104/52    I/O last 3 completed shifts:  In: 17.5 [I.V.:17.5]  Out: -   I/O this shift:  In: 844.2 [I.V.:644.2; IV Piggyback:200]  Out: -     General:   alert, appears stated age and cooperative   Lungs:   clear to auscultation bilaterally   Heart::   regular rate and rhythm   Extremities: peripheral pulses normal, no pedal edema, no clubbing or cyanosis   DTRs- 2+  bilaterally     NST: Category 2, 135 bpm, moderate BTBV, (+) accelerations, occasional small variable decelerations, overall reassuring and appropriate for GA  TOCO: none detected    Lab Review  Recent Results (from the past 24 hour(s))   Fetal fibronectin 26weeks 5days    Collection Time: 03/26/17  3:30 AM   Result Value Ref Range    Fetal Fibronectin Positive (A) Negative   CBC auto differential    Collection Time: 03/26/17  4:27 AM   Result Value Ref Range    WBC 14.05 (H) 3.90 - 12.70 K/uL    RBC 4.25 4.00 - 5.40 M/uL    Hemoglobin 11.5 (L) 12.0 - 16.0 g/dL    Hematocrit 34.2 (L) 37.0 - 48.5 %    MCV 81 (L) 82 - 98 fL    MCH 27.1 27.0 - 31.0 pg    MCHC 33.6 32.0 - 36.0 %    RDW 15.0 (H) 11.5 - 14.5 %    Platelets 212 150 - 350 K/uL    MPV 10.7 9.2 - 12.9 fL    Gran # 10.8 (H) 1.8 - 7.7 K/uL    Lymph # 1.8 1.0 - 4.8 K/uL    Mono # 1.2 (H) 0.3 - 1.0 K/uL    Eos # 0.1 0.0 - 0.5 K/uL    Baso # 0.02 0.00 - 0.20 K/uL    Gran% 77.0 (H) 38.0 - 73.0 %    Lymph% 12.8 (L) 18.0 - 48.0 %    Mono% 8.3 4.0 - 15.0 %    Eosinophil% 0.7 0.0 - 8.0 %    Basophil% 0.1 0.0 - 1.9 %     Differential Method Automated    Comprehensive metabolic panel    Collection Time: 17  4:27 AM   Result Value Ref Range    Sodium 134 (L) 136 - 145 mmol/L    Potassium 3.3 (L) 3.5 - 5.1 mmol/L    Chloride 105 95 - 110 mmol/L    CO2 19 (L) 23 - 29 mmol/L    Glucose 118 (H) 70 - 110 mg/dL    BUN, Bld 4 (L) 6 - 20 mg/dL    Creatinine 0.6 0.5 - 1.4 mg/dL    Calcium 9.1 8.7 - 10.5 mg/dL    Total Protein 6.4 6.0 - 8.4 g/dL    Albumin 2.6 (L) 3.5 - 5.2 g/dL    Total Bilirubin 0.4 0.1 - 1.0 mg/dL    Alkaline Phosphatase 79 55 - 135 U/L    AST 12 10 - 40 U/L    ALT 12 10 - 44 U/L    Anion Gap 10 8 - 16 mmol/L    eGFR if African American >60 >60 mL/min/1.73 m^2    eGFR if non African American >60 >60 mL/min/1.73 m^2   Type & Screen, Labor & Delivery    Collection Time: 17  4:33 AM   Result Value Ref Range    Group & Rh A POS     Indirect [place] NEG    POCT glucose    Collection Time: 17 12:16 PM   Result Value Ref Range    POCT Glucose 134 (H) 70 - 110 mg/dL        Assessment:         Active Hospital Problems    Diagnosis  POA    * premature rupture of membranes, antepartum [O42.919]  Unknown    Threatened  labor [O47.00]  Yes    Diet controlled gestational diabetes mellitus (GDM) in second trimester [O24.410]  Unknown    AMA (advanced maternal age) multigravida 35+ [O09.529]  Yes      Resolved Hospital Problems    Diagnosis Date Resolved POA   No resolved problems to display.     41 y.o.  female at 26w5d here for threatened PTL, now with PPROM, on IV magnesium sulfate.     Plan:     - Continue magnesium sulfate, no signs of toxicity at this time  - Close maternal monitoring  - continue PPROM abx  - s/p 1/2 BMZ this morning at OSH, second dose due 3/27 at 0415  - Recheck in 4 hours or PRN    Gilda Aparicio MD  OBGYN - PGY 2

## 2017-03-27 NOTE — PROGRESS NOTES
LATE ENTRY  MD to bedside for speculum exam due to patient reports of vaginal bleeding.  Patient Romanian speaking only,  at bedside, able to translate to some degree. She states she noticed vaginal bleeding about 1 hour prior.  She states she was having contractions earlier this AM that have resolved. She is documenting her contractions on sticky notes at the bedside. Last contraction occurred at 455 AM (spoke with patient around 915) She currently reports good FM.     Pelvic Exam:  Two quarter size spots of blood present on Jovanni's pad.   Moderate amount of vaginal blood in vaginal vault filling 3 proctoswabs.   No active bleed appreciated.    SVE 4/80/-3      - Cont continuous monitoring   - Cont MgSO4 for neuroprotection  - Cont PPROM antibiotics  - MFM scan today  - patterning BS, HbA1c pending  - s/p indocin    Navya Bueno MD  Ob/Gyn PGY-1  Pager # [phone]

## 2017-03-28 PROBLEM — Z98.51 S/P TUBAL LIGATION: Status: ACTIVE | Noted: 2017-03-28

## 2017-03-28 PROBLEM — Z30.09 UNWANTED FERTILITY: Status: ACTIVE | Noted: 2017-03-28

## 2017-03-28 PROBLEM — O42.919 PRETERM PREMATURE RUPTURE OF MEMBRANES, ANTEPARTUM: Status: RESOLVED | Noted: 2017-03-27 | Resolved: 2017-03-28

## 2017-03-28 LAB
ALLENS TEST: ABNORMAL
ANISOCYTOSIS BLD QL SMEAR: SLIGHT
BASOPHILS # BLD AUTO: 0.01 K/UL
BASOPHILS # BLD AUTO: ABNORMAL K/UL
BASOPHILS NFR BLD: 0 %
BASOPHILS NFR BLD: 0.1 %
DIFFERENTIAL METHOD: ABNORMAL
DIFFERENTIAL METHOD: ABNORMAL
EOSINOPHIL # BLD AUTO: 0 K/UL
EOSINOPHIL # BLD AUTO: ABNORMAL K/UL
EOSINOPHIL NFR BLD: 0 %
EOSINOPHIL NFR BLD: 0.3 %
ERYTHROCYTE [DISTWIDTH] IN BLOOD BY AUTOMATED COUNT: 15.2 %
ERYTHROCYTE [DISTWIDTH] IN BLOOD BY AUTOMATED COUNT: 15.4 %
ESTIMATED AVG GLUCOSE: 120 MG/DL
HBA1C MFR BLD HPLC: 5.8 %
HCO3 UR-SCNC: 20.3 MMOL/L (ref 24–28)
HCT VFR BLD AUTO: 31.5 %
HCT VFR BLD AUTO: 33.9 %
HGB BLD-MCNC: 10.6 G/DL
HGB BLD-MCNC: 11.2 G/DL
LYMPHOCYTES # BLD AUTO: 1.2 K/UL
LYMPHOCYTES # BLD AUTO: ABNORMAL K/UL
LYMPHOCYTES NFR BLD: 10.4 %
LYMPHOCYTES NFR BLD: 12 %
MCH RBC QN AUTO: 27.1 PG
MCH RBC QN AUTO: 27.3 PG
MCHC RBC AUTO-ENTMCNC: 33 %
MCHC RBC AUTO-ENTMCNC: 33.7 %
MCV RBC AUTO: 81 FL
MCV RBC AUTO: 82 FL
METAMYELOCYTES NFR BLD MANUAL: 1 %
MONOCYTES # BLD AUTO: 0.9 K/UL
MONOCYTES # BLD AUTO: ABNORMAL K/UL
MONOCYTES NFR BLD: 6 %
MONOCYTES NFR BLD: 7.6 %
MYELOCYTES NFR BLD MANUAL: 1 %
NEUTROPHILS # BLD AUTO: 9.6 K/UL
NEUTROPHILS NFR BLD: 79 %
NEUTROPHILS NFR BLD: 80.3 %
NEUTS BAND NFR BLD MANUAL: 1 %
PCO2 BLDA: 47.8 MMHG (ref 35–45)
PH SMN: 7.24 [PH] (ref 7.35–7.45)
PLATELET # BLD AUTO: 227 K/UL
PLATELET # BLD AUTO: 235 K/UL
PLATELET BLD QL SMEAR: ABNORMAL
PMV BLD AUTO: 10.2 FL
PMV BLD AUTO: 10.4 FL
PO2 BLDA: 15 MMHG (ref 80–100)
POC BE: -7 MMOL/L
POC SATURATED O2: 14 % (ref 95–100)
POCT GLUCOSE: 129 MG/DL (ref 70–110)
POCT GLUCOSE: 137 MG/DL (ref 70–110)
RBC # BLD AUTO: 3.88 M/UL
RBC # BLD AUTO: 4.14 M/UL
SAMPLE: ABNORMAL
SITE: ABNORMAL
WBC # BLD AUTO: 11.95 K/UL
WBC # BLD AUTO: 15.68 K/UL

## 2017-03-28 PROCEDURE — 63600175 PHARM REV CODE 636 W HCPCS: Performed by: ANESTHESIOLOGY

## 2017-03-28 PROCEDURE — 63600175 PHARM REV CODE 636 W HCPCS: Performed by: OBSTETRICS & GYNECOLOGY

## 2017-03-28 PROCEDURE — 82803 BLOOD GASES ANY COMBINATION: CPT

## 2017-03-28 PROCEDURE — 25000003 PHARM REV CODE 250: Performed by: STUDENT IN AN ORGANIZED HEALTH CARE EDUCATION/TRAINING PROGRAM

## 2017-03-28 PROCEDURE — 37000009 HC ANESTHESIA EA ADD 15 MINS: Performed by: OBSTETRICS & GYNECOLOGY

## 2017-03-28 PROCEDURE — 85025 COMPLETE CBC W/AUTO DIFF WBC: CPT

## 2017-03-28 PROCEDURE — 51702 INSERT TEMP BLADDER CATH: CPT

## 2017-03-28 PROCEDURE — 37000008 HC ANESTHESIA 1ST 15 MINUTES: Performed by: OBSTETRICS & GYNECOLOGY

## 2017-03-28 PROCEDURE — 58605 DIVISION OF FALLOPIAN TUBE: CPT | Mod: ,,, | Performed by: OBSTETRICS & GYNECOLOGY

## 2017-03-28 PROCEDURE — 88307 TISSUE EXAM BY PATHOLOGIST: CPT | Performed by: PATHOLOGY

## 2017-03-28 PROCEDURE — 85027 COMPLETE CBC AUTOMATED: CPT

## 2017-03-28 PROCEDURE — 0UB70ZZ EXCISION OF BILATERAL FALLOPIAN TUBES, OPEN APPROACH: ICD-10-PCS | Performed by: OBSTETRICS & GYNECOLOGY

## 2017-03-28 PROCEDURE — 63600175 PHARM REV CODE 636 W HCPCS: Performed by: STUDENT IN AN ORGANIZED HEALTH CARE EDUCATION/TRAINING PROGRAM

## 2017-03-28 PROCEDURE — 72200004 HC VAGINAL DELIVERY LEVEL I

## 2017-03-28 PROCEDURE — D9220A PRA ANESTHESIA: Mod: ,,, | Performed by: ANESTHESIOLOGY

## 2017-03-28 PROCEDURE — 11000001 HC ACUTE MED/SURG PRIVATE ROOM

## 2017-03-28 PROCEDURE — 25000003 PHARM REV CODE 250: Performed by: ANESTHESIOLOGY

## 2017-03-28 PROCEDURE — 88302 TISSUE EXAM BY PATHOLOGIST: CPT | Mod: 26,,, | Performed by: PATHOLOGY

## 2017-03-28 PROCEDURE — 59409 OBSTETRICAL CARE: CPT | Mod: AT,,, | Performed by: OBSTETRICS & GYNECOLOGY

## 2017-03-28 PROCEDURE — 88302 TISSUE EXAM BY PATHOLOGIST: CPT | Performed by: PATHOLOGY

## 2017-03-28 PROCEDURE — 85007 BL SMEAR W/DIFF WBC COUNT: CPT

## 2017-03-28 PROCEDURE — 25000003 PHARM REV CODE 250: Performed by: OBSTETRICS & GYNECOLOGY

## 2017-03-28 PROCEDURE — 88307 TISSUE EXAM BY PATHOLOGIST: CPT | Mod: 26,,, | Performed by: PATHOLOGY

## 2017-03-28 PROCEDURE — 36415 COLL VENOUS BLD VENIPUNCTURE: CPT

## 2017-03-28 PROCEDURE — 63600175 PHARM REV CODE 636 W HCPCS

## 2017-03-28 PROCEDURE — 58605 DIVISION OF FALLOPIAN TUBE: CPT

## 2017-03-28 PROCEDURE — 25000003 PHARM REV CODE 250

## 2017-03-28 RX ORDER — LIDOCAINE HYDROCHLORIDE AND EPINEPHRINE 15; 5 MG/ML; UG/ML
INJECTION, SOLUTION EPIDURAL
Status: DISCONTINUED | OUTPATIENT
Start: 2017-03-28 | End: 2017-04-05

## 2017-03-28 RX ORDER — MIDAZOLAM HYDROCHLORIDE 1 MG/ML
INJECTION INTRAMUSCULAR; INTRAVENOUS
Status: DISCONTINUED | OUTPATIENT
Start: 2017-03-28 | End: 2017-04-05

## 2017-03-28 RX ORDER — HYDROMORPHONE HYDROCHLORIDE 1 MG/ML
1 INJECTION, SOLUTION INTRAMUSCULAR; INTRAVENOUS; SUBCUTANEOUS ONCE
Status: DISCONTINUED | OUTPATIENT
Start: 2017-03-28 | End: 2017-03-28

## 2017-03-28 RX ORDER — METOCLOPRAMIDE HYDROCHLORIDE 5 MG/ML
INJECTION INTRAMUSCULAR; INTRAVENOUS
Status: DISPENSED
Start: 2017-03-28 | End: 2017-03-28

## 2017-03-28 RX ORDER — KETAMINE HYDROCHLORIDE 50 MG/ML
INJECTION, SOLUTION INTRAMUSCULAR; INTRAVENOUS
Status: DISCONTINUED | OUTPATIENT
Start: 2017-03-28 | End: 2017-04-05

## 2017-03-28 RX ORDER — HYDROMORPHONE HYDROCHLORIDE 2 MG/ML
INJECTION, SOLUTION INTRAMUSCULAR; INTRAVENOUS; SUBCUTANEOUS
Status: COMPLETED
Start: 2017-03-28 | End: 2017-03-28

## 2017-03-28 RX ORDER — OXYTOCIN/RINGER'S LACTATE 20/1000 ML
41.65 PLASTIC BAG, INJECTION (ML) INTRAVENOUS CONTINUOUS
Status: ACTIVE | OUTPATIENT
Start: 2017-03-28 | End: 2017-03-28

## 2017-03-28 RX ORDER — METHYLERGONOVINE MALEATE 0.2 MG/1
0.2 TABLET ORAL EVERY 6 HOURS
Status: COMPLETED | OUTPATIENT
Start: 2017-03-28 | End: 2017-03-29

## 2017-03-28 RX ORDER — DIPHENHYDRAMINE HYDROCHLORIDE 50 MG/ML
25 INJECTION INTRAMUSCULAR; INTRAVENOUS EVERY 4 HOURS PRN
Status: DISCONTINUED | OUTPATIENT
Start: 2017-03-28 | End: 2017-03-30 | Stop reason: HOSPADM

## 2017-03-28 RX ORDER — BUTORPHANOL TARTRATE 1 MG/ML
1 INJECTION INTRAMUSCULAR; INTRAVENOUS ONCE
Status: COMPLETED | OUTPATIENT
Start: 2017-03-28 | End: 2017-03-28

## 2017-03-28 RX ORDER — PHENYLEPHRINE HYDROCHLORIDE 10 MG/ML
INJECTION INTRAVENOUS
Status: DISCONTINUED | OUTPATIENT
Start: 2017-03-28 | End: 2017-04-05

## 2017-03-28 RX ORDER — HYDROCORTISONE 25 MG/G
CREAM TOPICAL 3 TIMES DAILY PRN
Status: DISCONTINUED | OUTPATIENT
Start: 2017-03-28 | End: 2017-03-30 | Stop reason: HOSPADM

## 2017-03-28 RX ORDER — OXYCODONE AND ACETAMINOPHEN 5; 325 MG/1; MG/1
1 TABLET ORAL EVERY 4 HOURS PRN
Status: DISCONTINUED | OUTPATIENT
Start: 2017-03-28 | End: 2017-03-30 | Stop reason: HOSPADM

## 2017-03-28 RX ORDER — IBUPROFEN 600 MG/1
600 TABLET ORAL EVERY 6 HOURS
Status: DISCONTINUED | OUTPATIENT
Start: 2017-03-28 | End: 2017-03-30 | Stop reason: HOSPADM

## 2017-03-28 RX ORDER — LIDOCAINE HYDROCHLORIDE 10 MG/ML
INJECTION INFILTRATION; PERINEURAL
Status: DISCONTINUED
Start: 2017-03-28 | End: 2017-03-28 | Stop reason: WASHOUT

## 2017-03-28 RX ORDER — MISOPROSTOL 200 UG/1
TABLET ORAL
Status: DISCONTINUED
Start: 2017-03-28 | End: 2017-03-28 | Stop reason: WASHOUT

## 2017-03-28 RX ORDER — FAMOTIDINE 10 MG/ML
INJECTION INTRAVENOUS
Status: COMPLETED
Start: 2017-03-28 | End: 2017-03-28

## 2017-03-28 RX ORDER — SODIUM CHLORIDE, SODIUM LACTATE, POTASSIUM CHLORIDE, CALCIUM CHLORIDE 600; 310; 30; 20 MG/100ML; MG/100ML; MG/100ML; MG/100ML
INJECTION, SOLUTION INTRAVENOUS CONTINUOUS
Status: DISCONTINUED | OUTPATIENT
Start: 2017-03-28 | End: 2017-03-28

## 2017-03-28 RX ORDER — ONDANSETRON 8 MG/1
8 TABLET, ORALLY DISINTEGRATING ORAL EVERY 8 HOURS PRN
Status: DISCONTINUED | OUTPATIENT
Start: 2017-03-28 | End: 2017-03-30 | Stop reason: HOSPADM

## 2017-03-28 RX ORDER — SODIUM CITRATE AND CITRIC ACID MONOHYDRATE 334; 500 MG/5ML; MG/5ML
SOLUTION ORAL
Status: COMPLETED
Start: 2017-03-28 | End: 2017-03-28

## 2017-03-28 RX ORDER — HYDROMORPHONE HYDROCHLORIDE 1 MG/ML
0.5 INJECTION, SOLUTION INTRAMUSCULAR; INTRAVENOUS; SUBCUTANEOUS ONCE
Status: COMPLETED | OUTPATIENT
Start: 2017-03-28 | End: 2017-03-28

## 2017-03-28 RX ORDER — CHLOROPROCAINE HYDROCHLORIDE 30 MG/ML
INJECTION, SOLUTION EPIDURAL; INFILTRATION; INTRACAUDAL; PERINEURAL
Status: DISCONTINUED | OUTPATIENT
Start: 2017-03-28 | End: 2017-04-05

## 2017-03-28 RX ORDER — BUPIVACAINE HYDROCHLORIDE 7.5 MG/ML
INJECTION, SOLUTION EPIDURAL; RETROBULBAR
Status: DISCONTINUED | OUTPATIENT
Start: 2017-03-28 | End: 2017-04-05

## 2017-03-28 RX ORDER — OXYCODONE AND ACETAMINOPHEN 10; 325 MG/1; MG/1
1 TABLET ORAL EVERY 4 HOURS PRN
Status: DISCONTINUED | OUTPATIENT
Start: 2017-03-28 | End: 2017-03-30 | Stop reason: HOSPADM

## 2017-03-28 RX ORDER — TERBUTALINE SULFATE 1 MG/ML
INJECTION SUBCUTANEOUS
Status: DISCONTINUED
Start: 2017-03-28 | End: 2017-03-28 | Stop reason: WASHOUT

## 2017-03-28 RX ORDER — SODIUM CHLORIDE, SODIUM LACTATE, POTASSIUM CHLORIDE, CALCIUM CHLORIDE 600; 310; 30; 20 MG/100ML; MG/100ML; MG/100ML; MG/100ML
INJECTION, SOLUTION INTRAVENOUS CONTINUOUS PRN
Status: DISCONTINUED | OUTPATIENT
Start: 2017-03-28 | End: 2017-04-05

## 2017-03-28 RX ORDER — FENTANYL CITRATE 50 UG/ML
INJECTION, SOLUTION INTRAMUSCULAR; INTRAVENOUS
Status: DISCONTINUED | OUTPATIENT
Start: 2017-03-28 | End: 2017-04-05

## 2017-03-28 RX ORDER — METHYLERGONOVINE MALEATE 0.2 MG/ML
INJECTION INTRAVENOUS
Status: DISCONTINUED
Start: 2017-03-28 | End: 2017-03-28 | Stop reason: WASHOUT

## 2017-03-28 RX ORDER — CARBOPROST TROMETHAMINE 250 UG/ML
INJECTION, SOLUTION INTRAMUSCULAR
Status: DISCONTINUED
Start: 2017-03-28 | End: 2017-03-28 | Stop reason: WASHOUT

## 2017-03-28 RX ORDER — OXYTOCIN 10 [USP'U]/ML
INJECTION, SOLUTION INTRAMUSCULAR; INTRAVENOUS
Status: DISCONTINUED
Start: 2017-03-28 | End: 2017-03-28 | Stop reason: WASHOUT

## 2017-03-28 RX ADMIN — METHYLERGONOVINE MALEATE 0.2 MG: 0.2 TABLET ORAL at 12:03

## 2017-03-28 RX ADMIN — KETAMINE HYDROCHLORIDE 20 MG: 50 INJECTION INTRAMUSCULAR; INTRAVENOUS at 10:03

## 2017-03-28 RX ADMIN — IBUPROFEN 600 MG: 600 TABLET, FILM COATED ORAL at 12:03

## 2017-03-28 RX ADMIN — OXYCODONE HYDROCHLORIDE AND ACETAMINOPHEN 1 TABLET: 5; 325 TABLET ORAL at 08:03

## 2017-03-28 RX ADMIN — CHLOROPROCAINE HYDROCHLORIDE 10 ML: 30 INJECTION, SOLUTION EPIDURAL; INFILTRATION; INTRACAUDAL; PERINEURAL at 10:03

## 2017-03-28 RX ADMIN — PROMETHAZINE HYDROCHLORIDE 12.5 MG: 25 INJECTION INTRAMUSCULAR; INTRAVENOUS at 12:03

## 2017-03-28 RX ADMIN — HYDROMORPHONE HYDROCHLORIDE 1 MG: 2 INJECTION, SOLUTION INTRAMUSCULAR; INTRAVENOUS; SUBCUTANEOUS at 02:03

## 2017-03-28 RX ADMIN — LIDOCAINE HYDROCHLORIDE AND EPINEPHRINE 3 ML: 15; 5 INJECTION, SOLUTION EPIDURAL at 10:03

## 2017-03-28 RX ADMIN — METHYLERGONOVINE MALEATE 0.2 MG: 0.2 TABLET ORAL at 06:03

## 2017-03-28 RX ADMIN — FENTANYL CITRATE 90 MCG: 50 INJECTION, SOLUTION INTRAMUSCULAR; INTRAVENOUS at 10:03

## 2017-03-28 RX ADMIN — SODIUM CHLORIDE, SODIUM LACTATE, POTASSIUM CHLORIDE, AND CALCIUM CHLORIDE: .6; .31; .03; .02 INJECTION, SOLUTION INTRAVENOUS at 03:03

## 2017-03-28 RX ADMIN — CHLOROPROCAINE HYDROCHLORIDE 5 ML: 30 INJECTION, SOLUTION EPIDURAL; INFILTRATION; INTRACAUDAL; PERINEURAL at 10:03

## 2017-03-28 RX ADMIN — PHENYLEPHRINE HYDROCHLORIDE 100 MCG: 10 INJECTION INTRAVENOUS at 10:03

## 2017-03-28 RX ADMIN — Medication 41.65 MILLI-UNITS/MIN: at 03:03

## 2017-03-28 RX ADMIN — PHENYLEPHRINE HYDROCHLORIDE 200 MCG: 10 INJECTION INTRAVENOUS at 10:03

## 2017-03-28 RX ADMIN — AMPICILLIN SODIUM 2 G: 2 INJECTION, POWDER, FOR SOLUTION INTRAMUSCULAR; INTRAVENOUS at 03:03

## 2017-03-28 RX ADMIN — SODIUM CITRATE AND CITRIC ACID MONOHYDRATE 30 ML: 500; 334 SOLUTION ORAL at 09:03

## 2017-03-28 RX ADMIN — BUTORPHANOL TARTRATE 1 MG: 1 INJECTION, SOLUTION INTRAMUSCULAR; INTRAVENOUS at 12:03

## 2017-03-28 RX ADMIN — MIDAZOLAM HYDROCHLORIDE 2 MG: 1 INJECTION, SOLUTION INTRAMUSCULAR; INTRAVENOUS at 10:03

## 2017-03-28 RX ADMIN — IBUPROFEN 600 MG: 600 TABLET, FILM COATED ORAL at 06:03

## 2017-03-28 RX ADMIN — FENTANYL CITRATE 10 MCG: 50 INJECTION, SOLUTION INTRAMUSCULAR; INTRAVENOUS at 09:03

## 2017-03-28 RX ADMIN — BUPIVACAINE HYDROCHLORIDE 1.6 ML: 7.5 INJECTION, SOLUTION EPIDURAL; RETROBULBAR at 09:03

## 2017-03-28 RX ADMIN — SODIUM CHLORIDE, SODIUM LACTATE, POTASSIUM CHLORIDE, AND CALCIUM CHLORIDE: 600; 310; 30; 20 INJECTION, SOLUTION INTRAVENOUS at 09:03

## 2017-03-28 RX ADMIN — GENTAMICIN SULFATE 308 MG: 40 INJECTION, SOLUTION INTRAMUSCULAR; INTRAVENOUS at 04:03

## 2017-03-28 RX ADMIN — FAMOTIDINE 20 MG: 10 INJECTION, SOLUTION INTRAVENOUS at 09:03

## 2017-03-28 RX ADMIN — HYDROMORPHONE HYDROCHLORIDE 0.5 MG: 1 INJECTION, SOLUTION INTRAMUSCULAR; INTRAVENOUS; SUBCUTANEOUS at 03:03

## 2017-03-28 RX ADMIN — SODIUM CHLORIDE, SODIUM LACTATE, POTASSIUM CHLORIDE, AND CALCIUM CHLORIDE: 600; 310; 30; 20 INJECTION, SOLUTION INTRAVENOUS at 11:03

## 2017-03-28 RX ADMIN — OXYCODONE HYDROCHLORIDE AND ACETAMINOPHEN 1 TABLET: 10; 325 TABLET ORAL at 12:03

## 2017-03-28 NOTE — TRANSFER OF CARE
Anesthesia Transfer of Care Note    Patient: Jill Kimbrough    Procedure(s) Performed: Procedure(s) (LRB):  LIGATION-TUBAL-POST PARTUM (Bilateral)    Patient location: PACU    Anesthesia Type: epidural    Transport from OR: Transported from OR on room air with adequate spontaneous ventilation    Post pain: adequate analgesia    Post assessment: no apparent anesthetic complications    Post vital signs: stable    Level of consciousness: awake, alert and oriented    Nausea/Vomiting: no nausea/vomiting    Complications: none          Last vitals:   Visit Vitals    BP (!) 98/58    Pulse 82    Temp 36.7 °C (98.1 °F) (Oral)    Resp 18    Ht 5' (1.524 m)    Wt 85.7 kg (188 lb 15 oz)    LMP 09/20/2016    SpO2 (!) 94%    Breastfeeding Unknown    BMI 36.9 kg/m2

## 2017-03-28 NOTE — PROGRESS NOTES
MD to bedside. Pt w/ blood tinged fluid and contractions.    S: Pt reports feeling painful contractions Q 10 minutes. Reports leaking fluid.    O:  Temp:  [96.3 °F (35.7 °C)-98.6 °F (37 °C)] 96.6 °F (35.9 °C)  Pulse:  [] 97  Resp:  [18-20] 18  SpO2:  [94 %-100 %] 98 %  BP: ()/(53-89) 98/53  SSE: Cervix visualized. Redundant and folded on itself, unable to tell dilation visually. Bleeding minimal on exam and is mixed with cervical mucous and amniotic fluid.  No bright red or heavy vaginal bleeding noted.   SVE: Cervix 4/80/-3 ; very redundant cervix noted,  Floppy/multiparous and feels as if cervix has extra lip on left side.  fetal vertex palpated.    No active bright red bleeding noted after examination.     A/P:   41 y.o. PPROM D#3 with PTL, AMA, grandmultip, GDM (diet), now having painful contractions. Some bloody cervical mucous noted. Cervix unchanged. No heavy or continued bright red bleeding.   - Pad counts overnight   - Continuous monitoring overnight   - stadol and phenergan x 1 for pain control   - If contractions and cervical change noted, would start Mag for fetal NP and covered for GBS unk w/ current PPROM abx  - Discussed w/ Dr. Young who came to bedside       Juliet Dowd MD PGY-3   Ob-Gyn Resident   # [phone]

## 2017-03-28 NOTE — PROGRESS NOTES
Mother encouraged to provide breastmilk for her NICU baby. Benefits of breastmilk discussed with mother. Mother declines pumping at this time, but would like to start after she gets some sleep. Mother enthusiastic about pumping but is exhausted from the evening's events.

## 2017-03-28 NOTE — PROGRESS NOTES
Dr. Newsome at bedside. Caren used in order to confirm that consents for BTL are accurate. Pt states that she wants to have the BTL done and that she has not eaten since 2000 last night (2/27). Pt will have BTL done this morning.

## 2017-03-28 NOTE — ANESTHESIA PROCEDURE NOTES
Epidural    Patient location during procedure: OR   Reason for block: primary anesthetic   Diagnosis: Tubal   Start time: 3/28/2017 10:21 AM  Timeout: 3/28/2017 10:20 AM  End time: 3/28/2017 10:27 AM  Staffing  Anesthesiologist: BRYANNA LEE  Performed by: anesthesiologist   Preanesthetic Checklist  Completed: patient identified, site marked, surgical consent, pre-op evaluation, timeout performed, IV checked, risks and benefits discussed, monitors and equipment checked, anesthesia consent given, hand hygiene performed and patient being monitored  Preparation  Patient position: sitting  Prep: ChloraPrep  Patient monitoring: Pulse Ox and Blood Pressure  Epidural  Skin Anesthetic: lidocaine 1%  Skin Wheal: 5 mL  Administration type: single shot  Approach: midline  Interspace: L3-4  Injection technique: HARSHAL saline  Needle and Epidural Catheter  Needle type: Tuohy   Needle gauge: 17  Needle length: 3.5 inches  Needle insertion depth: 7 cm  Catheter type: springwound and multi-orifice  Catheter size: 19 G  Catheter at skin depth: 11 cm  Test dose: 3 mL of lidocaine 1.5% with Epi 1-to-200,000  Additional Documentation: incremental injection, negative aspiration for heme and CSF, no paresthesia on injection, no signs/symptoms of IV or SA injection, no significant complaints from patient and no significant pain on injection  Needle localization: anatomical landmarks  Medications:  Bolus administered: 5 mL of 3.0% chloroprocaine  Assessment  Upper dermatomal levels - Left: T4  Right: T4   Dermatomal levels determined by pinch or prick  Ease of block: easy  Patient's tolerance of the procedure: comfortable throughout block and no complaints  Additional Notes  Dural puncture performed with epidural.

## 2017-03-28 NOTE — L&D DELIVERY NOTE
cephalic after approximately 1 maternal push.  Under no anesthesia.  Infant delivered ZOFIA over intact perineum.  No nuchal cord noted.  Female infant also tolerated the delivery well.  Cord was clamped and cut, and infant was immediately handed off to waiting NICU team who attended due to  gestation.  Umbilical arterial gas and venous blood obtained.  Placenta delivery attempted in usual fashion.  cord avulsed from placenta, which was then delivered manually.   IV pitocin given per protocol.  Uterine tone noted. No cervical, vaginal, or perineal lacerations.  Patient tolerated delivery well. She was given dilaudid 1.5 mg IV total during delivery for pain control.   cc  Staff present for entire procedure.  S/L/N counts correct x2.    Randa Regan MD  OBGYN - PGY 2     I WAS PRESENT AND SUPERVISED THE ENTIRE DELIVERY; SANGITA REGAN AND ANGELA WERE SCRUBBED     Delivery Information for  Zachary Kimbrough    Birth information:  YOB: 2016   Time of birth: 2:43 AM   Sex: female   Head Delivery Date/Time: 3/28/2017  2:43 AM   Delivery type: Vaginal, Spontaneous Delivery   Gestational Age: 27w0d    Delivery Providers    Delivering clinician:  MARIAM VENEGAS   Other personnel:   Provider Role   RANDA REGAN Resident   ANGELA, DARSHAN Resident   MARÍA GERARDO Registered Nurse   ISABELA DAVIES Charge Nurse   ABADIE, ELIZABETH Registered Nurse   FRIDA ALEXANDRE Registered Nurse   WASHINGTONEDUAR SPANN Surgical Tech   PIETER MEYER Anesthesiologist                  [place] Measurements    Weight:  1250 g            Assessment    Living status:  Yes   Apgars    1 Minute:   5 Minute:   10 Minute 15 Minute 20 Minute   Skin Color: 0  1       Heart Rate: 1  2       Reflex Irritability: 2  1       Muscle Tone: 2  2       Respiratory Effort: 2  2       Total: 7  8                  Apgars Assigned By:  NICU          Assisted Delivery Details:    Forceps attempted?:  No    Vacuum extractor attempted?:  No             Shoulder Dystocia    Shoulder dystocia present?:  No                                             Presentation and Position    Presentation: Vertex   Position:                    Interventions/Resuscitation    Method:  NICU Attended        Cord    Vessels:  3 vessels   Complications:  None   Delayed Cord Clamping?:  No   Cord Blood Disposition:  Sent with Baby   Gases Sent?:  Yes        Placenta    Date and time:  3/28/2017  2:48 AM   Removal:  Spontaneous   Appearance:  Intact   Placenta disposition:  Pathology            Labor Events:       labor: Yes     Labor Onset Date/Time:         Dilation Complete Date/Time:         Start Pushing Date/Time:       Rupture Date/Time:              Rupture type:  premature rupture of membranes         Fluid Amount:        Fluid Color:        Fluid Odor:        Membrane Status (PeriCalm): SRM (Spontaneous Rupture)      Rupture Date/Time (PeriCalm):        Fluid Amount (PeriCalm): Small      Fluid Color (PeriCalm): Clear       steroids: Full Course     Antibiotics given for GBS:       Induction: none     Indications for induction:        Augmentation:       Indications for augmentation:       Labor complications: None     Additional complications:          Cervical ripening:                     Delivery:      Episiotomy: None     Indication for Episiotomy:       Perineal Lacerations:   Repaired:      Periurethral Laceration:   Repaired:     Labial Laceration:   Repaired:     Sulcus Laceration:   Repaired:     Vaginal Laceration:   Repaired:     Cervical Laceration:   Repaired:     Repair suture: None     Repair # of packets: 0     Blood loss (ml): 350     Vaginal Sweep Performed: Yes     Surgicount Correct: No       Other providers:       Anesthesia    Method:  None              Details (if applicable):  Trial of Labor      Categorization:      Priority:     Indications for :      Incision Type:       Additional  information:  Forceps:    Vacuum:    Breech:    Observed anomalies    Other (Comments):

## 2017-03-28 NOTE — PROGRESS NOTES
Dr. Jean and Dr. Young at bedside; pt c/o pain 3/10 q 10min; pain started following a large gush of amniotic fluid; per SVE pt 4/80/-3; will con't on CEFM; pt advised to inform RN if pain worsens; medication ordered to help with pain management.

## 2017-03-28 NOTE — PROGRESS NOTES
Mother would like to pump for her baby at 1342. Mother encouraged to provide breastmilk by pumping. Benefits of breastmilk discussed. Breastpump initiated. Mother educated on pump use, cleaning pump parts, labeling containers and storage of breastmilk. Mother to call her nurse with further questions.

## 2017-03-28 NOTE — OP NOTE
Operative Note  Post-Partum Bilateral Tubal Ligation    Date of Procedure: 03/28/2017    Pre-Operative Diagnosis:   1.Desires permanent sterilization,   Post-Partum Day # 0    Post-Operative Diagnosis: Same    Procedure: Post-Partum BTL via Clarkrange method    Surgeon: Dr. Johnie WING    Assistant:  Dr. Doug Bhatti, Dr. Navya Bueno    Anesthesia: epidural placed after spinal found to be inadequate    EBL:20mL    IVF:lactated Ringer's 1000cc    UOP: 120 cc    Specimen: Bilateral Tubal Segments    Complications: None    Findings:   Normal bilateral fallopian tubes    Procedure in Detail:  After verifying consent, the patient was taken to the OR where she was placed in the dorsal supine position after epidural anesthesia was placed and found to be adequate.   The abdomen was then prepped and draped in the normal, sterile fashion and two allis clamps were placed lateral to the umbilicus. The skin  incision was made using the scalpel inferior to the umbilicus.  The fascia was then identified and grasped with two allis clamps and entered sharply using the hemostatz and curve liu scissors.  The peritoneum was then identified, grasped with 2 hemostats and entered sharply using the metzenbaum scissors.  A single stitch was placed on inferior peritoneum and fascia, followed with a single stitch placed on superior peritoneum and fascia.    The surgical bed was then airplaned to the patient's left. Two [employer] retractors were used to expose the left cornua of the uterus, the left tube was then identified and brought out of the incision using a barbi clamp.  The tube was then followed out to the fimbriated end.  The isthmic portion of the tube was grasped in an avascular portion and elevated with the barbi clamp. A 2 cm portion of the fallopian tube was ligated both proximal and distal with 2-0 Vicryl using the Clarkrange method.  The ligated tubal portion was fulgated, excised and sent to pathology for confirmation.   A hemostatic stitch was placed around the left distal portion of tube including the fimbriae. After hemostasis was verified, the tube was carefully placed back into the abdominal cavity.    The surgical bed was then airplaned to the patient's right. The right fallopian tube was ligated in a similar fashion. Good hemostasis was confirmed.  The umbilical fascia and peritoneum was closed using  0 Vicryl.  The skin was closed in a subcuticular fashion using 4-0 monocryl.  The patient tolerated the procedure well, S/L/N counts were correct x 2, patient was taken to recovery in stable condition.    Navya Bueno MD     I was present for and observed the entire tubal ligation.

## 2017-03-28 NOTE — MEDICAL/APP STUDENT
Delivery Discharge Summary  Obstetrics      Primary OB Clinician: Dr. Jacome    Admission date: 3/26/2017  Discharge date: 2017    Disposition: To home, self care    Admit Dx:      Patient Active Problem List   Diagnosis    AMA (advanced maternal age) multigravida 35+    Abdominal pain complicating pregnancy    Threatened  labor    Diet controlled gestational diabetes mellitus (GDM) in second trimester     (spontaneous vaginal delivery)    Unwanted fertility     Discharge Dx:    Patient Active Problem List   Diagnosis    AMA (advanced maternal age) multigravida 35+    Abdominal pain complicating pregnancy    Threatened  labor    Diet controlled gestational diabetes mellitus (GDM) in second trimester     (spontaneous vaginal delivery)    Unwanted fertility       Procedure: RUST    Hospital Course:  Jill Kimbrough is a 41 y.o. now , PPD #*** who was transferred from [facility] and admitted on 3/26/2017 at 26wk5d for threatened pre-term labor. On initial assessment, vital signs were stable and physical exam was normal. Infant was in cephalic presentation. Patient was subsequently admitted to labor and delivery unit with signed consents. *** premature rupture of membranes noted on exam, managed with antibiotics. ***Labor course was managed with steroids, magnesium sulfate, and indomethacin.  Patient delivered a single viable  female. Please see delivery note for further details. Pt was in stable condition post delivery and was transferred to the Mother-Baby Unit. Her postpartum course was ***uncomplicated. On discharge day, patient's pain is controlled with oral pain medications. Pt is tolerating ambulation without SOB or CP, and PO diet without N/V. Reports lochia is ***mild. Denies any HA, vision changes, F/C, LE swelling. Denies any breast pain/soreness.  Pt in stable condition and ready for discharge. She has been instructed to continue ***as indicated as  well as pain medications as needed and to follow up in the OB clinic in ***6 weeks with her obstetrics provider.    Pertinent studies:  Postpartum CBC  Lab Results   Component Value Date    WBC 14.05 (H) 03/26/2017    HGB 11.5 (L) 03/26/2017    HCT 34.2 (L) 03/26/2017    MCV 81 (L) 03/26/2017     03/26/2017     ***    Tubal Ligation: {Obgyn tubal:22505}  Feeding Method: {Source; infant milk:26619}  Rh Immune Globulin Given(A POS): N/A  Rubella Vaccine Given: N/A  Tdap Vaccine Given: N/A    Delivery:    Episiotomy:     Lacerations:     Repair suture:     Repair # of packets:     Blood loss (ml):       Birth information:  YOB: 2016   Time of birth: 3:04 AM   Sex: female   Delivery type:    Gestational Age: 27w0d    Delivery Clinician:      Other providers:       Additional  information:  Forceps:    Vacuum:    Breech:    Observed anomalies      Living?:           APGARS  One minute Five minutes Ten minutes   Skin color:         Heart rate:         Grimace:         Muscle tone:         Breathing:         Totals:           Placenta: Delivered:       appearance      Patient Instructions:   Current Discharge Medication List      CONTINUE these medications which have NOT CHANGED    Details   PNV NO.115/IRON FUMARATE/FA (PRENATAL #115-IRON-FOLIC ACID ORAL) Take by mouth.               Discharge Procedure Orders  Glucose Tolerance 2 Hour   Standing Status: Future  Standing Exp. Date: 05/27/18   Order Comments: Schedule 6 weeks after delivery         ***

## 2017-03-28 NOTE — ANESTHESIA PROCEDURE NOTES
Spinal    Diagnosis: IUP, tubal  Patient location during procedure: OR  Start time: 3/28/2017 10:16 AM  Timeout: 3/28/2017 10:15 AM  End time: 3/28/2017 10:18 AM  Staffing  Anesthesiologist: BRYANNA LEE  Resident/CRNA: OSMANI MCCOLLUM  Performed by: resident/CRNA   Preanesthetic Checklist  Completed: patient identified, site marked, surgical consent, pre-op evaluation, timeout performed, IV checked, risks and benefits discussed and monitors and equipment checked  Spinal Block  Patient position: sitting  Prep: ChloraPrep  Patient monitoring: heart rate and continuous pulse ox  Approach: midline  Location: L3-4  Injection technique: single shot  CSF Fluid: clear free-flowing CSF  Needle  Needle type: Quincke   Needle gauge: 25 G  Needle length: 3.5 in  Additional Documentation: incremental injection, negative aspiration for heme and no paresthesia on injection  Needle localization: anatomical landmarks  Assessment  Sensory level: T7   Dermatomal levels determined by pinch or prick  Ease of block: easy  Patient's tolerance of the procedure: comfortable throughout block and no complaints  Medications:  Bolus administered: 1.6 mL of 0.75 and with dextrose bupivacaine  Opioid administered: 10 mcg of   fentanyl

## 2017-03-29 LAB — POCT GLUCOSE: 90 MG/DL (ref 70–110)

## 2017-03-29 PROCEDURE — 11000001 HC ACUTE MED/SURG PRIVATE ROOM

## 2017-03-29 PROCEDURE — 25000003 PHARM REV CODE 250: Performed by: STUDENT IN AN ORGANIZED HEALTH CARE EDUCATION/TRAINING PROGRAM

## 2017-03-29 PROCEDURE — 25000003 PHARM REV CODE 250: Performed by: OBSTETRICS & GYNECOLOGY

## 2017-03-29 RX ORDER — IBUPROFEN 600 MG/1
600 TABLET ORAL EVERY 6 HOURS
Qty: 30 TABLET | Refills: 2 | Status: SHIPPED | OUTPATIENT
Start: 2017-03-29

## 2017-03-29 RX ORDER — OXYCODONE AND ACETAMINOPHEN 5; 325 MG/1; MG/1
1 TABLET ORAL EVERY 4 HOURS PRN
Qty: 20 TABLET | Refills: 0 | Status: SHIPPED | OUTPATIENT
Start: 2017-03-29

## 2017-03-29 RX ADMIN — METHYLERGONOVINE MALEATE 0.2 MG: 0.2 TABLET ORAL at 12:03

## 2017-03-29 RX ADMIN — OXYCODONE HYDROCHLORIDE AND ACETAMINOPHEN 1 TABLET: 5; 325 TABLET ORAL at 10:03

## 2017-03-29 RX ADMIN — OXYCODONE HYDROCHLORIDE AND ACETAMINOPHEN 1 TABLET: 5; 325 TABLET ORAL at 01:03

## 2017-03-29 RX ADMIN — IBUPROFEN 600 MG: 600 TABLET, FILM COATED ORAL at 12:03

## 2017-03-29 RX ADMIN — IBUPROFEN 600 MG: 600 TABLET, FILM COATED ORAL at 11:03

## 2017-03-29 RX ADMIN — IBUPROFEN 600 MG: 600 TABLET, FILM COATED ORAL at 06:03

## 2017-03-29 NOTE — DISCHARGE INSTRUCTIONS
Preparation and Hygiene:    1. Shower daily.  2. Wear a clean bra each day and wash daily in warm soapy water.  3. Change wet or moist breast pads frequently.  Moist pads can promote growth of germs.  4. Actively wash your hands, paying close attention to the area around and under your fingernails, thoroughly with soap and water for 15 seconds before pumping or handling your milk.  Re-wash your hands if you touch anything (scratching your nose, answering the phone, etc) while pumping or handling your milk.   5. Before pumping your breasts, assemble the pump collection kit and have ready the sterile container and labels.  Place these items on a clean surface next to the breastpump.  6. Each time after you have finished pumping, take apart all of the parts of the breastpump collection kit and place them in a separate cleaning container (do not place them in the sink).  Be sure to remove the yellow valve from the breastshield and separate the white membrane from the yellow valve.  Rinse all of these parts with cool water.  Then use a new sponge and/or bottle brush and dishwashing detergent to clean the parts.  Rinse off the soapy water with cool water and air dry on a clean towel covered with a clean cloth.  All parts may also be washed after each use in the top rack of a .  7. Once each day, sterilize all of the parts of the breastpump collection kit.  This can be done by boiling the kit parts for 10 minutes or by using a Quick Clean Micro-Steam Bag made by [employer]  8. If condensation appears in the tubing, continue to run the pump with the tubing attached for 1-2 minutes or until the tubing is dry.   9. Notify your babys nurse or doctor if you become ill or need to take any medication, even over-the-counter medicines.        Collection and Storage of Expressed Breastmilk:         1. Pump your breasts at least 8-10 times every 24 hours.  Double pump (both breasts at  the same time) for at least 15-20  minutes using the most suction that is comfortable.    2. Write the date and time of pumping and the name of any medications you are takingon the babys pre-printed hospital identification label.   3. Place your babys pre-printed hospital identification label on each container of breastmilk.  Additional pre-printed labels can be obtained from your babys nurse.  If your expressed breastmilk is not correctly labeled, the nurse cannot feed the milk to the baby.       4. Place a brightly colored sticker on the top of each container of milk pumped during the first 30 days.  This identifies the milk as special and having higher levels of nutrients and anti-infective properties that are so important for your baby.  Additional stickers can be obtained from the lactation consultants or your babys nurse.  5.   Do not touch the inside of the storage containers or lids.  6.      Pour the amount of expressed milk needed for 1 of your babys feedings into each   storage container. Use a new container(s) for each pumping.  Additional storage   containers can be obtained from your babys nurse.        7.       Tightly screw the lid onto the container and place immediately into the       refrigerator fordaily transportation to the hospital.   Do not freeze your milk      unless asked to do so by your babys nurse.  However, if you are not able to      visit your baby each day, place the expressed breastmilk in the freezer.  8.   Expressed breastmilk should be refrigerated or frozen within 1 hour of      pumping.  9.      Do not store expressed breastmilk on the door of your refrigerator or freezer where the temperature is warmer.         Transportation of Expressed Breastmilk:    1. Refrigerated breastmilk or frozen milk should be packed tightly together in a cooler with frozen, blue gel-packs to keep the milk frozen.  DO NOT USE ICE CUBES (WET ICE) TO TRANSPORT FROZEN MILK.   A clean towel can be used to fill any extra space  between containers of frozen milk.  2.    Bring your expressed milk from home each time you visit the baby.

## 2017-03-29 NOTE — PLAN OF CARE
Problem: Breastfeeding (Adult,Obstetrics,Pediatric)  Goal: Signs and Symptoms of Listed Potential Problems Will be Absent, Minimized or Managed (Breastfeeding)  Signs and symptoms of listed potential problems will be absent, minimized or managed by discharge/transition of care (reference Breastfeeding (Adult,Obstetrics,Pediatric) CPG).   Outcome: Ongoing (interventions implemented as appropriate)    03/29/17 1345   Breastfeeding   Problems Assessed (Breastfeeding) all   Problems Present (Breastfeeding) other (see comments)   Lactation education provided in regards to pumping, collection, storage, transportation and obtaining a hospital grade breast pump. Acknowledged understanding. POC discussed via . Acknowledged understanding.

## 2017-03-29 NOTE — ANESTHESIA POSTPROCEDURE EVALUATION
Anesthesia Post Evaluation    Patient: Jill Kimbrough    Procedure(s) Performed: Procedure(s) (LRB):  LIGATION-TUBAL-POST PARTUM (Bilateral)    Final Anesthesia Type: epidural  Patient location during evaluation: floor  Patient participation: Yes- Able to Participate  Level of consciousness: awake and alert and oriented  Post-procedure vital signs: reviewed and stable  Pain management: adequate  Airway patency: patent  PONV status at discharge: No PONV  Anesthetic complications: no      Cardiovascular status: blood pressure returned to baseline and hemodynamically stable  Respiratory status: unassisted, spontaneous ventilation and room air  Hydration status: euvolemic  Follow-up not needed.        Visit Vitals    /70    Pulse 78    Temp 36.9 °C (98.4 °F)    Resp 18    Ht 5' (1.524 m)    Wt 85.7 kg (188 lb 15 oz)    LMP 09/20/2016    SpO2 100%    Breastfeeding Yes    BMI 36.9 kg/m2       Pain/Elvis Score: Pain Rating Prior to Med Admin: 2 (3/29/2017  6:01 PM)  Pain Rating Post Med Admin: 0 (3/29/2017  5:00 PM)

## 2017-03-29 NOTE — PROGRESS NOTES
POSTPARTUM PROGRESS NOTE     Jill Kimbrough is a 41 y.o. female PPD #1/POD#1 status post Spontaneous vaginal delivery/PPBTL at 27w0d in a pregnancy complicated by PTL, PPROM, GDM and AMA. Patient is doing well this morning. She denies nausea, vomiting, fever or chills.  Patient reports moderate abdominal pain at the site of her incision that is well relieved by oral pain medications. Lochia is mild to moderate  and decreasing. Patient is voiding without difficulty and ambulating with no difficulty. She has passed flatus, and has not had BM.  Patient does plan to breast feed. S/p BTL for contraception.    Objective:       Temp:  [95.5 °F (35.3 °C)-98.8 °F (37.1 °C)] 97.1 °F (36.2 °C)  Pulse:  [67-90] 78  Resp:  [16-18] 18  SpO2:  [90 %-100 %] 100 %  BP: ()/(51-80) 104/66    General:   alert, cooperative and no distress   Lungs:   clear to auscultation bilaterally   Heart:   regular rate and rhythm   Abdomen:  soft, appropriately tender, nondistended, +BS   Uterus:  firm located at the umblicus.        Incision: Periumbilical bandage in place, clean, dry and intact   Extremities: no edema, redness or tenderness in the calves or thighs       Lab Review  Recent Results (from the past 4 hour(s))   POCT glucose    Collection Time: 17  6:14 AM   Result Value Ref Range    POCT Glucose 90 70 - 110 mg/dL       I/O    Intake/Output Summary (Last 24 hours) at 17 0621  Last data filed at 17 0614   Gross per 24 hour   Intake          2866.66 ml   Output             1495 ml   Net          1371.66 ml        Assessment:     Patient Active Problem List   Diagnosis    AMA (advanced maternal age) multigravida 35+    Abdominal pain complicating pregnancy    Threatened  labor    Diet controlled gestational diabetes mellitus (GDM) in second trimester     (spontaneous vaginal delivery)    S/p PPBTL on 3/28        Plan:   1. Postpartum care:  - Patient doing well. Continue routine management and  advances.  - Continue PO pain meds. Pain well controlled.  - Heme: H/h 10/32, 11/33  - Encourage ambulation  - Contraception: s/p BTL  - Lactation consult prn, pumping for infant in NICU    2. GDM  - accucheck 90        Dispo: As patient meets milestones, will plan to discharge PPD 2.    Kamryn Ley

## 2017-03-29 NOTE — LACTATION NOTE
03/28/17 1815   Maternal Infant Assessment   Breast Density soft;Bilateral:   Areola elastic;Bilateral:   Nipple(s) everted;Bilateral:   Pain/Comfort Assessments   Pain Assessment Performed Yes       Number Scale   Presence of Pain denies   Location - Side Bilateral   Location nipple(s)   Pain Rating: Activity 0   Maternal Infant Feeding   Maternal Preparation hand hygiene   Maternal Emotional State assist needed;relaxed  (with breastpumping)   Time Spent (min) 30-60 min   Breastfeeding Education milk expression, electric pump   Equipment Type/Education   Pump Type Symphony   Breast Pump Type double electric, hospital grade   Breast Pump Flange Type hard   Breast Pump Flange Size 24 mm   Breast Pumping pumped until emptied;Bilateral Breasts:   Pumping Frequency (times) (8 -10 times in 24 hours)   Lactation Referrals   Lactation Consult Initial assessment;Knowledge deficit;Pump teaching   Lactation Interventions   Attachment Promotion counseling provided;infant-mother separation minimized  (use breastpump at baby's bedside)   Breastfeeding Assistance electric breast pump used;support offered   Maternal Breastfeeding Support encouragement offered;infant-mother separation minimized;lactation counseling provided;maternal hydration promoted;maternal rest encouraged    Ayde, #[id number], interpreted the following:  praised patient for her desire to provide her baby with breastmilk; educated her on the benefits of breastmilk/breastfeeding for her and her baby; with her permission applied warm compresses to her breasts and washed her hands; assisted with use of breastpump, highest comfortable suction; patient did not express colostrum while using breastpump; patient declined hand expressing; requested she call her RN for assistance with use of breastpump/hand expression; encouraged her to use breastpump at baby's bedside;

## 2017-03-30 VITALS
RESPIRATION RATE: 18 BRPM | WEIGHT: 188.94 LBS | OXYGEN SATURATION: 98 % | DIASTOLIC BLOOD PRESSURE: 69 MMHG | TEMPERATURE: 99 F | SYSTOLIC BLOOD PRESSURE: 118 MMHG | HEART RATE: 66 BPM | HEIGHT: 60 IN | BODY MASS INDEX: 37.09 KG/M2

## 2017-03-30 PROBLEM — O09.40 GRAND MULTIPARITY WITH ANTENATAL PROBLEM: Status: ACTIVE | Noted: 2017-03-30

## 2017-03-30 PROCEDURE — 90715 TDAP VACCINE 7 YRS/> IM: CPT | Performed by: OBSTETRICS & GYNECOLOGY

## 2017-03-30 PROCEDURE — 63600175 PHARM REV CODE 636 W HCPCS: Performed by: OBSTETRICS & GYNECOLOGY

## 2017-03-30 PROCEDURE — 90471 IMMUNIZATION ADMIN: CPT | Performed by: OBSTETRICS & GYNECOLOGY

## 2017-03-30 PROCEDURE — 25000003 PHARM REV CODE 250: Performed by: OBSTETRICS & GYNECOLOGY

## 2017-03-30 RX ADMIN — OXYCODONE HYDROCHLORIDE AND ACETAMINOPHEN 1 TABLET: 5; 325 TABLET ORAL at 08:03

## 2017-03-30 RX ADMIN — IBUPROFEN 600 MG: 600 TABLET, FILM COATED ORAL at 05:03

## 2017-03-30 RX ADMIN — CLOSTRIDIUM TETANI TOXOID ANTIGEN (FORMALDEHYDE INACTIVATED), CORYNEBACTERIUM DIPHTHERIAE TOXOID ANTIGEN (FORMALDEHYDE INACTIVATED), BORDETELLA PERTUSSIS TOXOID ANTIGEN (GLUTARALDEHYDE INACTIVATED), BORDETELLA PERTUSSIS FILAMENTOUS HEMAGGLUTININ ANTIGEN (FORMALDEHYDE INACTIVATED), BORDETELLA PERTUSSIS PERTACTIN ANTIGEN, AND BORDETELLA PERTUSSIS FIMBRIAE 2/3 ANTIGEN 0.5 ML: 5; 2; 2.5; 5; 3; 5 INJECTION, SUSPENSION INTRAMUSCULAR at 10:03

## 2017-03-30 NOTE — DISCHARGE SUMMARY
Delivery Discharge Summary  Obstetrics      Primary OB Clinician: Dr. Jacome    Admission date: 3/26/2017  Discharge date: 2017    Disposition: To home, self care    Admit Dx:      Patient Active Problem List   Diagnosis    AMA (advanced maternal age) multigravida 35+    Abdominal pain complicating pregnancy    Threatened  labor    Diet controlled gestational diabetes mellitus (GDM) in second trimester     (spontaneous vaginal delivery)    S/p PPBTL on 3/28    Grand multiparity with  problem    Encounter for routine screening for malformation using ultrasonics     Discharge Dx:    Patient Active Problem List   Diagnosis    AMA (advanced maternal age) multigravida 35+    Abdominal pain complicating pregnancy    Threatened  labor    Diet controlled gestational diabetes mellitus (GDM) in second trimester     (spontaneous vaginal delivery)    S/p PPBTL on 3/28    Grand multiparity with  problem    Encounter for routine screening for malformation using ultrasonics       Procedure: [facility]    Hospital Course:  Jill Kimbrough is a 41 y.o. now , PPD #2 who was transferred from [facility] and admitted on 3/26/2017 at 26wk5d for threatened pre-term labor. On initial assessment, vital signs were stable and physical exam was normal. Infant was in cephalic presentation. Patient was subsequently admitted to antepartum unit with signed consents. Patient was started on BMZ course. On HD#1,  premature rupture of membranes noted on exam and latency antibiotics were initiated as well as MgSO4 for fetal neurprotection.   Patient delivered a single viable  female on PPROM day #3. Please see delivery note for further details. Pt was in stable condition post delivery and was transferred to the Mother-Baby Unit. Her postpartum course was uncomplicated. She was taken for PPBTL on POD#0 which was uncomplicated, please see separate op note for details. On discharge  day, patient's pain is controlled with oral pain medications. Pt is tolerating ambulation without SOB or CP, and PO diet without N/V. Reports lochia is mild. Denies any HA, vision changes, F/C, LE swelling. Denies any breast pain/soreness.  Pt in stable condition and ready for discharge. She has been instructed to continue home medication as indicated as well as pain medications as needed and to follow up in the OB clinic in 6 weeks with her obstetrics provider.    Pertinent studies:  Postpartum CBC  Lab Results   Component Value Date    WBC 11.95 03/28/2017    HGB 11.2 (L) 03/28/2017    HCT 33.9 (L) 03/28/2017    MCV 82 03/28/2017     03/28/2017     Tubal Ligation: done after vaginal delivery see separate note  Feeding Method: breast  Rh Immune Globulin Given(A POS): N/A  Rubella Vaccine Given: N/A, reactive  Tdap Vaccine Given: To be given prior to discharge per nursing    Delivery:    Episiotomy: None   Lacerations:     Repair suture: None   Repair # of packets: 0   Blood loss (ml): 350     Birth information:  YOB: 2016   Time of birth: 2:43 AM   Sex: female   Delivery type: Vaginal, Spontaneous Delivery   Gestational Age: 27w0d    Delivery Clinician:      Other providers:       Additional  information:  Forceps:    Vacuum:    Breech:    Observed anomalies      Living?:           APGARS  One minute Five minutes Ten minutes   Skin color:         Heart rate:         Grimace:         Muscle tone:         Breathing:         Totals: 7  8        Placenta: Delivered:       appearance      Patient Instructions:   Current Discharge Medication List      START taking these medications    Details   ibuprofen (ADVIL,MOTRIN) 600 MG tablet Take 1 tablet (600 mg total) by mouth every 6 (six) hours.  Qty: 30 tablet, Refills: 2      oxycodone-acetaminophen (PERCOCET) 5-325 mg per tablet Take 1 tablet by mouth every 4 (four) hours as needed.  Qty: 20 tablet, Refills: 0         CONTINUE these medications which  have NOT CHANGED    Details   PNV NO.115/IRON FUMARATE/FA (PRENATAL #115-IRON-FOLIC ACID ORAL) Take by mouth.               Discharge Procedure Orders  Glucose Tolerance 2 Hour   Standing Status: Future  Standing Exp. Date: 05/27/18   Order Comments: Schedule 6 weeks after delivery     Diet general     Activity as tolerated   Scheduling Instructions: Pelvic rest (nothing in the vagina) until 6 week postpartum checkup.     Call MD for:  temperature >100.4     Call MD for:  persistent nausea and vomiting or diarrhea     Call MD for:  severe uncontrolled pain     Call MD for:  redness, tenderness, or signs of infection (pain, swelling, redness, odor or green/yellow discharge around incision site)     Call MD for:  difficulty breathing or increased cough     Call MD for:  severe persistent headache     Call MD for:  persistent dizziness, light-headedness, or visual disturbances     Call MD for:  increased confusion or weakness         Kamryn Newsome MD  OB/GYN, PGY-1

## 2017-03-30 NOTE — PROGRESS NOTES
POSTPARTUM PROGRESS NOTE     Jill Kimbrough is a 41 y.o. female PPD #2/POD#2 status post Spontaneous vaginal delivery/PPBTL at 27w0d in a pregnancy complicated by PTL, PPROM, GDM and AMA. Patient is doing well this morning. She denies nausea, vomiting, fever or chills.    Patient reports moderate abdominal pain at the site of her incision that is well relieved by oral pain medications. Also reports some back pain at site of spinal anesthesia. Lochia is mild to moderate  and decreasing. Patient is voiding without difficulty and ambulating with no difficulty. She has passed flatus, and has not had BM. Patient does plan to breast feed. S/p BTL for contraception.    Objective:       Temp:  [97.9 °F (36.6 °C)-98.5 °F (36.9 °C)] 97.9 °F (36.6 °C)  Pulse:  [70-78] 70  Resp:  [18-20] 18  SpO2:  [98 %] 98 %  BP: (105-125)/(59-71) 109/59    General:   alert, cooperative and no distress   Lungs:   clear to auscultation bilaterally   Heart:   regular rate and rhythm   Abdomen:  soft, appropriately tender, nondistended, +BS   Uterus:  firm located at the umblicus.    MSK:  no bruising noted at site of spinal placement   Incision: Periumbilical bandage in place, clean, dry and intact   Extremities: no edema, redness or tenderness in the calves or thighs       Lab Review  No results found for this or any previous visit (from the past 4 hour(s)).    I/O  No intake or output data in the 24 hours ending 17 0709     Assessment:     Patient Active Problem List   Diagnosis    AMA (advanced maternal age) multigravida 35+    Abdominal pain complicating pregnancy    Threatened  labor    Diet controlled gestational diabetes mellitus (GDM) in second trimester     (spontaneous vaginal delivery)    S/p PPBTL on 3/28    Grand multiparity with  problem    Encounter for routine screening for malformation using ultrasonics        Plan:   1. Postpartum care:  - Patient doing well. Continue routine management and  advances.  - Continue PO pain meds. Pain well controlled.  - Heme: H/h 10/32, 11/33  - Encourage ambulation  - Contraception: s/p BTL  - Lactation consult prn, pumping for infant in NICU  - Rh positive    2. GDM  - accucheck 90    Dispo: As patient meets milestones, will plan to discharge today, PPD 2.    Kamryn Ley

## 2017-03-30 NOTE — PROGRESS NOTES
Patient received after visit summary and verbalized understanding of discharge instructions. Instructed patient to wait to go to NICU until lactation nurse could complete discharge teaching. Patient left unit to visit NICU and discharged after visiting infant.

## 2017-03-30 NOTE — MEDICAL/APP STUDENT
Delivery Discharge Summary  Obstetrics      Primary OB Clinician: Dr. Sha Jett III    Admission date: 3/26/2017  Discharge date: 2017    Disposition: To home, self care    Admit Dx:      Patient Active Problem List   Diagnosis    AMA (advanced maternal age) multigravida 35+    Abdominal pain complicating pregnancy    Threatened  labor    Diet controlled gestational diabetes mellitus (GDM) in second trimester     (spontaneous vaginal delivery)    S/p PPBTL on 3/28    Grand multiparity with  problem    Encounter for routine screening for malformation using ultrasonics     Discharge Dx:    Patient Active Problem List   Diagnosis    AMA (advanced maternal age) multigravida 35+    Abdominal pain complicating pregnancy    Threatened  labor    Diet controlled gestational diabetes mellitus (GDM) in second trimester     (spontaneous vaginal delivery)    S/p PPBTL on 3/28    Grand multiparity with  problem    Encounter for routine screening for malformation using ultrasonics       Procedure:     Hospital Course:  Jill Kimbrough is a 41 y.o. now , POD D #2 & PPD D #2 who was admitted on 3/26/2017 at 1:12 PM for threatened PTL. On initial assessment, vital signs were stable and physical exam was normal. Infant was in cephalic presentation. Patient was subsequently admitted to labor and delivery unit with signed consents. Labor course was managed with epidural anesthesia.  Patient delivered a single viable  female. Please see delivery note for further details. Pt was in stable condition post delivery and was transferred to the Mother-Baby Unit. Her postpartum course was uncomplicated. On discharge day, patient's pain is controlled with oral pain medications. Pt is tolerating ambulation without SOB or CP, and PO diet without N/V. Reports lochia is mild to moderate and is decreasing. Denies any HA, vision changes, F/C, LE swelling. Denies any breast  pain/soreness.  Pt in stable condition and ready for discharge. She has been instructed to continue PO pain medications as needed and to follow up in the OB clinic in ***6 weeks with her obstetrics provider.    Pertinent studies:  Postpartum CBC  Lab Results   Component Value Date    WBC 11.95 03/28/2017    HGB 11.2 (L) 03/28/2017    HCT 33.9 (L) 03/28/2017    MCV 82 03/28/2017     03/28/2017     ***    Tubal Ligation: done after vaginal delivery by delivering physician, same day  Feeding Method: breast  Rh Immune Globulin Given(A POS): N/A  Rubella Vaccine Given: N/A, reactive  Tdap Vaccine Given: yes, last administered 11/9/2005    Delivery:    Episiotomy: None   Lacerations:     Repair suture: None   Repair # of packets: 0   Blood loss (ml): 350     Birth information:  YOB: 2016   Time of birth: 2:43 AM   Sex: female   Delivery type: Vaginal, Spontaneous Delivery   Gestational Age: 27w0d    Delivery Clinician:      Other providers:       Additional  information:  Forceps:    Vacuum:    Breech:    Observed anomalies      Living?:           APGARS  One minute Five minutes Ten minutes   Skin color:         Heart rate:         Grimace:         Muscle tone:         Breathing:         Totals: 7  8        Placenta: Delivered:       appearance      Patient Instructions:   Current Discharge Medication List      START taking these medications    Details   ibuprofen (ADVIL,MOTRIN) 600 MG tablet Take 1 tablet (600 mg total) by mouth every 6 (six) hours.  Qty: 30 tablet, Refills: 2      oxycodone-acetaminophen (PERCOCET) 5-325 mg per tablet Take 1 tablet by mouth every 4 (four) hours as needed.  Qty: 20 tablet, Refills: 0         CONTINUE these medications which have NOT CHANGED    Details   PNV NO.115/IRON FUMARATE/FA (PRENATAL #115-IRON-FOLIC ACID ORAL) Take by mouth.               Discharge Procedure Orders  Glucose Tolerance 2 Hour   Standing Status: Future  Standing Exp. Date: 05/27/18   Order  Comments: Schedule 6 weeks after delivery         Toyin Yu, MS3

## 2017-03-30 NOTE — PROGRESS NOTES
Mother care guide discharge teaching reviewed with pt via Sharri . All questions answered. Pt verbalized understanding.

## 2017-04-17 ENCOUNTER — TELEPHONE (OUTPATIENT)
Dept: OBSTETRICS AND GYNECOLOGY | Facility: CLINIC | Age: 42
End: 2017-04-17

## 2017-04-17 NOTE — TELEPHONE ENCOUNTER
----- Message from Elvira Hagan sent at 4/17/2017  1:12 PM CDT -----  Contact: [phone]/ self   Pt its requesting a post partum appointment for next week . Please advise

## 2017-04-24 ENCOUNTER — TELEPHONE (OUTPATIENT)
Dept: OBSTETRICS AND GYNECOLOGY | Facility: CLINIC | Age: 42
End: 2017-04-24

## 2017-04-24 NOTE — TELEPHONE ENCOUNTER
Spoke with patient, scheduled postpartum appointment, May 3,2017 at 8:00 am, mailed out appointment date and time, patient verbalized understanding.

## 2017-05-03 ENCOUNTER — POSTPARTUM VISIT (OUTPATIENT)
Dept: OBSTETRICS AND GYNECOLOGY | Facility: CLINIC | Age: 42
End: 2017-05-03
Payer: MEDICAID

## 2017-05-03 VITALS
DIASTOLIC BLOOD PRESSURE: 90 MMHG | WEIGHT: 189.63 LBS | BODY MASS INDEX: 37.03 KG/M2 | SYSTOLIC BLOOD PRESSURE: 116 MMHG

## 2017-05-03 PROCEDURE — 99999 PR PBB SHADOW E&M-EST. PATIENT-LVL II: CPT | Mod: PBBFAC,,, | Performed by: OBSTETRICS & GYNECOLOGY

## 2017-05-03 PROCEDURE — 99212 OFFICE O/P EST SF 10 MIN: CPT | Mod: PBBFAC,PO | Performed by: OBSTETRICS & GYNECOLOGY

## 2017-05-03 PROCEDURE — 99499 UNLISTED E&M SERVICE: CPT | Mod: S$PBB,,, | Performed by: OBSTETRICS & GYNECOLOGY

## 2017-05-03 NOTE — PROGRESS NOTES
CC: Post-partum follow-up    Jill Kimbrough is a 41 y.o. female  who presents for post-partum visit.  She is S/P a .  + PP BTL on 3/28/17 at [facility]   Baby still in the NICU  No fever.   No bowel / bladder complaints.    Depression: NO  Contraception: no method, BTL    Pregnancy was complicated by:  PPROM   AMA    BP (!) 116/90  Wt 86 kg (189 lb 9.5 oz)  LMP 2016  Breastfeeding? Yes  BMI 37.03 kg/m2    ROS:  GENERAL: No fever, chills, fatigability.  VULVAR: No pain, no lesions and no itching.  VAGINAL: No relaxation, no itching, no discharge, no abnormal bleeding and no lesions.  ABDOMEN: No abdominal pain. Denies nausea. Denies vomiting. No diarrhea. No constipation  BREAST: Denies pain. No lumps. No discharge.  URINARY: No incontinence, no nocturia, no frequency and no dysuria.  CARDIOVASCULAR: No chest pain. No shortness of breath. No leg cramps.  NEUROLOGICAL: No headaches. No vision changes.    PHYSICAL EXAM:  ABDOMEN:  Soft, non-tender, non-distended  VULVA:  Normal, no lesions  CERVIX:  Without lesions, polyps or tenderness.  UTERUS:  Normal size, shape, consistency, no mass or tenderness.  ADNEXA:  Normal in size without mass or tenderness    IMP:  Doing well S/P   Instructions / precautions reviewed        PLAN:  May resume normal activities  Return: for annual visit or PRN     Floyd Jacome M.D.   OB/GYN

## 2025-05-19 PROCEDURE — 93005 ELECTROCARDIOGRAM TRACING: CPT

## 2025-05-19 PROCEDURE — 93010 ELECTROCARDIOGRAM REPORT: CPT | Mod: ,,, | Performed by: INTERNAL MEDICINE

## 2025-05-20 ENCOUNTER — HOSPITAL ENCOUNTER (EMERGENCY)
Facility: HOSPITAL | Age: 50
Discharge: HOME | End: 2025-05-20
Attending: EMERGENCY MEDICINE

## 2025-05-20 VITALS
OXYGEN SATURATION: 99 % | HEIGHT: 64 IN | WEIGHT: 150 LBS | DIASTOLIC BLOOD PRESSURE: 86 MMHG | HEART RATE: 88 BPM | TEMPERATURE: 99 F | RESPIRATION RATE: 18 BRPM | BODY MASS INDEX: 25.61 KG/M2 | SYSTOLIC BLOOD PRESSURE: 155 MMHG

## 2025-05-20 DIAGNOSIS — S02.2XXA CLOSED FRACTURE OF NASAL BONE, INITIAL ENCOUNTER: ICD-10-CM
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
## 2025-05-20 DIAGNOSIS — M79.89 SOFT TISSUE MASS: ICD-10-CM

## 2025-05-20 DIAGNOSIS — R07.89 STERNAL PAIN: ICD-10-CM
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
## 2025-05-20 DIAGNOSIS — E04.1 THYROID NODULE GREATER THAN OR EQUAL TO 1 CM IN DIAMETER INCIDENTALLY NOTED ON IMAGING STUDY: ICD-10-CM

## 2025-05-20 LAB
ABSOLUTE EOSINOPHIL (OHS): 0.07 K/UL
ABSOLUTE MONOCYTE (OHS): 0.64 K/UL (ref 0.3–1)
ABSOLUTE NEUTROPHIL COUNT (OHS): 6.44 K/UL (ref 1.8–7.7)
ALBUMIN SERPL BCP-MCNC: 3.9 G/DL (ref 3.5–5.2)
ALP SERPL-CCNC: 82 UNIT/L (ref 40–150)
ALT SERPL W/O P-5'-P-CCNC: 83 UNIT/L (ref 10–44)
ANION GAP (OHS): 13 MMOL/L (ref 8–16)
AST SERPL-CCNC: 43 UNIT/L (ref 11–45)
BASOPHILS # BLD AUTO: 0.04 K/UL
BASOPHILS NFR BLD AUTO: 0.4 %
BILIRUB SERPL-MCNC: 0.4 MG/DL (ref 0.1–1)
BUN SERPL-MCNC: 13 MG/DL (ref 6–20)
CALCIUM SERPL-MCNC: 9.6 MG/DL (ref 8.7–10.5)
CHLORIDE SERPL-SCNC: 103 MMOL/L (ref 95–110)
CO2 SERPL-SCNC: 19 MMOL/L (ref 23–29)
CREAT SERPL-MCNC: 0.6 MG/DL (ref 0.5–1.4)
ERYTHROCYTE [DISTWIDTH] IN BLOOD BY AUTOMATED COUNT: 12.8 % (ref 11.5–14.5)
GFR SERPLBLD CREATININE-BSD FMLA CKD-EPI: >60 ML/MIN/1.73/M2
GLUCOSE SERPL-MCNC: 231 MG/DL (ref 70–110)
HCT VFR BLD AUTO: 44.6 % (ref 37–48.5)
HCV AB SERPL QL IA: NORMAL
HGB BLD-MCNC: 15.2 GM/DL (ref 12–16)
HIV 1+2 AB+HIV1 P24 AG SERPL QL IA: NORMAL
HOLD SPECIMEN: NORMAL
IMM GRANULOCYTES # BLD AUTO: 0.06 K/UL (ref 0–0.04)
IMM GRANULOCYTES NFR BLD AUTO: 0.6 % (ref 0–0.5)
LYMPHOCYTES # BLD AUTO: 3 K/UL (ref 1–4.8)
MCH RBC QN AUTO: 27.2 PG (ref 27–31)
MCHC RBC AUTO-ENTMCNC: 34.1 G/DL (ref 32–36)
MCV RBC AUTO: 80 FL (ref 82–98)
NUCLEATED RBC (/100WBC) (OHS): 0 /100 WBC
OHS QRS DURATION: 88 MS
OHS QRS DURATION: 94 MS
OHS QTC CALCULATION: 451 MS
OHS QTC CALCULATION: 487 MS
PLATELET # BLD AUTO: 307 K/UL (ref 150–450)
PMV BLD AUTO: 11 FL (ref 9.2–12.9)
POTASSIUM SERPL-SCNC: 3.7 MMOL/L (ref 3.5–5.1)
PROT SERPL-MCNC: 8 GM/DL (ref 6–8.4)
RBC # BLD AUTO: 5.58 M/UL (ref 4–5.4)
RELATIVE EOSINOPHIL (OHS): 0.7 %
RELATIVE LYMPHOCYTE (OHS): 29.3 % (ref 18–48)
RELATIVE MONOCYTE (OHS): 6.2 % (ref 4–15)
RELATIVE NEUTROPHIL (OHS): 62.8 % (ref 38–73)
SODIUM SERPL-SCNC: 135 MMOL/L (ref 136–145)
WBC # BLD AUTO: 10.25 K/UL (ref 3.9–12.7)

## 2025-05-20 PROCEDURE — 63600175 PHARM REV CODE 636 W HCPCS: Performed by: EMERGENCY MEDICINE

## 2025-05-20 PROCEDURE — 90714 TD VACC NO PRESV 7 YRS+ IM: CPT

## 2025-05-20 PROCEDURE — 99285 EMERGENCY DEPT VISIT HI MDM: CPT | Mod: 25

## 2025-05-20 PROCEDURE — 87389 HIV-1 AG W/HIV-1&-2 AB AG IA: CPT | Performed by: PHYSICIAN ASSISTANT

## 2025-05-20 PROCEDURE — 96372 THER/PROPH/DIAG INJ SC/IM: CPT

## 2025-05-20 PROCEDURE — 25000003 PHARM REV CODE 250

## 2025-05-20 PROCEDURE — 90471 IMMUNIZATION ADMIN: CPT

## 2025-05-20 PROCEDURE — 96365 THER/PROPH/DIAG IV INF INIT: CPT

## 2025-05-20 PROCEDURE — 85025 COMPLETE CBC W/AUTO DIFF WBC: CPT

## 2025-05-20 PROCEDURE — 96375 TX/PRO/DX INJ NEW DRUG ADDON: CPT

## 2025-05-20 PROCEDURE — 12041 INTMD RPR N-HF/GENIT 2.5CM/<: CPT

## 2025-05-20 PROCEDURE — 80053 COMPREHEN METABOLIC PANEL: CPT

## 2025-05-20 PROCEDURE — 63600175 PHARM REV CODE 636 W HCPCS

## 2025-05-20 PROCEDURE — 86803 HEPATITIS C AB TEST: CPT | Performed by: PHYSICIAN ASSISTANT

## 2025-05-20 RX ORDER — AMOXICILLIN AND CLAVULANATE POTASSIUM 875; 125 MG/1; MG/1
1 TABLET, FILM COATED ORAL 2 TIMES DAILY
Qty: 20 TABLET | Refills: 0 | Status: SHIPPED | OUTPATIENT
Start: 2025-05-20 | End: 2025-05-30

## 2025-05-20 RX ORDER — HYDROCODONE BITARTRATE AND ACETAMINOPHEN 5; 325 MG/1; MG/1
1 TABLET ORAL
Refills: 0 | Status: DISCONTINUED | OUTPATIENT
Start: 2025-05-20 | End: 2025-05-20

## 2025-05-20 RX ORDER — LIDOCAINE HYDROCHLORIDE 10 MG/ML
5 INJECTION, SOLUTION EPIDURAL; INFILTRATION; INTRACAUDAL; PERINEURAL
Status: COMPLETED | OUTPATIENT
Start: 2025-05-20 | End: 2025-05-20

## 2025-05-20 RX ORDER — MORPHINE SULFATE 4 MG/ML
4 INJECTION, SOLUTION INTRAMUSCULAR; INTRAVENOUS
Refills: 0 | Status: COMPLETED | OUTPATIENT
Start: 2025-05-20 | End: 2025-05-20

## 2025-05-20 RX ADMIN — MORPHINE SULFATE 4 MG: 4 INJECTION INTRAVENOUS at 03:05

## 2025-05-20 RX ADMIN — CLOSTRIDIUM TETANI TOXOID ANTIGEN (FORMALDEHYDE INACTIVATED) AND CORYNEBACTERIUM DIPHTHERIAE TOXOID ANTIGEN (FORMALDEHYDE INACTIVATED) 0.5 ML: 5; 2 INJECTION, SUSPENSION INTRAMUSCULAR at 03:05

## 2025-05-20 RX ADMIN — AMPICILLIN SODIUM AND SULBACTAM SODIUM 1.5 G: 1; .5 INJECTION, POWDER, FOR SOLUTION INTRAMUSCULAR; INTRAVENOUS at 03:05

## 2025-05-20 RX ADMIN — LIDOCAINE HYDROCHLORIDE 50 MG: 10 SOLUTION INTRAVENOUS at 04:05
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
## 2025-05-20 NOTE — ED NOTES
I assumed care of this patient at this time. Report received from NELIDA Rodas. Pt is resting comfortably in ED stretcher + no acute distress observed. Pt is AAOx4. RR is even, unlabored, and spontaneous + pt's oxygen saturation is 99% on room air at this time. Pt denies needs at this time. Pt remains on continuous cardiac monitor, pulse ox, and BP cuff to cycle. Bed low and locked; side rails up x2; call light within reach.
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
## 2025-05-20 NOTE — DISCHARGE INSTRUCTIONS
Le [place]field recetado Augmentin, un antibiótico que tomará celi vez por la mañana y otra por la noche robert los próximos 10 días. Por favor, consulte con ybarra médico de cabecera en la clínica. No sumerja la mano en agua ni la moje demasiado, ya que esto puede afectar los puntos de sutura. Si empieza a tener fiebre o el dolor en la mano empeora considerablemente, acuda a urgencias para celi evaluación inmediata.